# Patient Record
Sex: FEMALE | Race: AMERICAN INDIAN OR ALASKA NATIVE | Employment: OTHER | URBAN - METROPOLITAN AREA
[De-identification: names, ages, dates, MRNs, and addresses within clinical notes are randomized per-mention and may not be internally consistent; named-entity substitution may affect disease eponyms.]

---

## 2019-01-01 ENCOUNTER — APPOINTMENT (OUTPATIENT)
Dept: ULTRASOUND IMAGING | Facility: HOSPITAL | Age: 84
End: 2019-01-01
Attending: FAMILY MEDICINE
Payer: MEDICARE

## 2019-01-01 ENCOUNTER — HOSPITAL ENCOUNTER (EMERGENCY)
Facility: HOSPITAL | Age: 84
Discharge: HOME | End: 2019-01-01
Attending: EMERGENCY MEDICINE
Payer: MEDICARE

## 2019-01-01 ENCOUNTER — LAB ENCOUNTER (OUTPATIENT)
Dept: LAB | Facility: HOSPITAL | Age: 84
End: 2019-01-01
Attending: FAMILY MEDICINE
Payer: MEDICARE

## 2019-01-01 ENCOUNTER — HOSPITAL ENCOUNTER (OUTPATIENT)
Dept: CV DIAGNOSTICS | Facility: HOSPITAL | Age: 84
Discharge: HOME | End: 2019-01-01
Attending: FAMILY MEDICINE
Payer: MEDICARE

## 2019-01-01 ENCOUNTER — APPOINTMENT (OUTPATIENT)
Dept: CT IMAGING | Facility: HOSPITAL | Age: 84
End: 2019-01-01
Attending: EMERGENCY MEDICINE
Payer: MEDICARE

## 2019-01-01 VITALS
WEIGHT: 150 LBS | OXYGEN SATURATION: 100 % | RESPIRATION RATE: 18 BRPM | SYSTOLIC BLOOD PRESSURE: 146 MMHG | HEIGHT: 60 IN | TEMPERATURE: 98 F | HEART RATE: 67 BPM | DIASTOLIC BLOOD PRESSURE: 80 MMHG | BODY MASS INDEX: 29.45 KG/M2

## 2019-01-01 DIAGNOSIS — I10 HYPERTENSION, ESSENTIAL: ICD-10-CM

## 2019-01-01 DIAGNOSIS — I44.0 AV BLOC FIRST DEGREE: ICD-10-CM

## 2019-01-01 DIAGNOSIS — R53.83 FATIGUE: ICD-10-CM

## 2019-01-01 DIAGNOSIS — E87.1 HYPOSMOLALITY SYNDROME: ICD-10-CM

## 2019-01-01 DIAGNOSIS — R94.31 ABNORMAL EKG: ICD-10-CM

## 2019-01-01 DIAGNOSIS — M79.89 SWELLING OF LIMB: ICD-10-CM

## 2019-01-01 DIAGNOSIS — R51.9 FACIAL PAIN: ICD-10-CM

## 2019-01-01 DIAGNOSIS — R51.9 HEAD PAIN: ICD-10-CM

## 2019-01-01 DIAGNOSIS — R42 VERTIGO: Primary | ICD-10-CM

## 2019-01-01 DIAGNOSIS — R42 DIZZINESS: ICD-10-CM

## 2019-01-01 DIAGNOSIS — R42 DIZZINESS AND GIDDINESS: Primary | ICD-10-CM

## 2019-01-01 DIAGNOSIS — I10 ESSENTIAL HYPERTENSION, MALIGNANT: ICD-10-CM

## 2019-01-01 DIAGNOSIS — R51.9 ACUTE NONINTRACTABLE HEADACHE, UNSPECIFIED HEADACHE TYPE: ICD-10-CM

## 2019-01-01 DIAGNOSIS — D64.9 ANEMIA, UNSPECIFIED: ICD-10-CM

## 2019-01-01 LAB
ALBUMIN SERPL-MCNC: 3.3 G/DL (ref 3.4–5)
ALBUMIN SERPL-MCNC: 3.4 G/DL (ref 3.4–5)
ALBUMIN/GLOB SERPL: 0.8 {RATIO} (ref 1–2)
ALBUMIN/GLOB SERPL: 1 {RATIO} (ref 1–2)
ALP LIVER SERPL-CCNC: 109 U/L (ref 55–142)
ALP LIVER SERPL-CCNC: 113 U/L (ref 55–142)
ALT SERPL-CCNC: 14 U/L (ref 13–56)
ALT SERPL-CCNC: 15 U/L (ref 13–56)
ANION GAP SERPL CALC-SCNC: 5 MMOL/L (ref 0–18)
ANION GAP SERPL CALC-SCNC: 7 MMOL/L (ref 0–18)
AST SERPL-CCNC: 17 U/L (ref 15–37)
AST SERPL-CCNC: 36 U/L (ref 15–37)
ATRIAL RATE: 72 BPM
BASOPHILS # BLD AUTO: 0.03 X10(3) UL (ref 0–0.2)
BASOPHILS # BLD AUTO: 0.04 X10(3) UL (ref 0–0.2)
BASOPHILS NFR BLD AUTO: 0.6 %
BASOPHILS NFR BLD AUTO: 0.9 %
BILIRUB SERPL-MCNC: 0.4 MG/DL (ref 0.1–2)
BILIRUB SERPL-MCNC: 0.5 MG/DL (ref 0.1–2)
BILIRUB UR QL STRIP.AUTO: NEGATIVE
BUN BLD-MCNC: 10 MG/DL (ref 7–18)
BUN BLD-MCNC: 14 MG/DL (ref 7–18)
BUN/CREAT SERPL: 15.2 (ref 10–20)
BUN/CREAT SERPL: 17.9 (ref 10–20)
CALCIUM BLD-MCNC: 8.8 MG/DL (ref 8.5–10.1)
CALCIUM BLD-MCNC: 9 MG/DL (ref 8.5–10.1)
CHLORIDE SERPL-SCNC: 101 MMOL/L (ref 98–112)
CHLORIDE SERPL-SCNC: 98 MMOL/L (ref 98–112)
CLARITY UR REFRACT.AUTO: CLEAR
CO2 SERPL-SCNC: 23 MMOL/L (ref 21–32)
CO2 SERPL-SCNC: 25 MMOL/L (ref 21–32)
CREAT BLD-MCNC: 0.66 MG/DL (ref 0.55–1.02)
CREAT BLD-MCNC: 0.78 MG/DL (ref 0.55–1.02)
DEPRECATED HBV CORE AB SER IA-ACNC: 49.9 NG/ML (ref 18–340)
DEPRECATED RDW RBC AUTO: 33.2 FL (ref 35.1–46.3)
DEPRECATED RDW RBC AUTO: 34.2 FL (ref 35.1–46.3)
EOSINOPHIL # BLD AUTO: 0.24 X10(3) UL (ref 0–0.7)
EOSINOPHIL # BLD AUTO: 0.29 X10(3) UL (ref 0–0.7)
EOSINOPHIL NFR BLD AUTO: 4.9 %
EOSINOPHIL NFR BLD AUTO: 6.3 %
ERYTHROCYTE [DISTWIDTH] IN BLOOD BY AUTOMATED COUNT: 15.2 % (ref 11–15)
ERYTHROCYTE [DISTWIDTH] IN BLOOD BY AUTOMATED COUNT: 15.4 % (ref 11–15)
GLOBULIN PLAS-MCNC: 3.4 G/DL (ref 2.8–4.4)
GLOBULIN PLAS-MCNC: 4 G/DL (ref 2.8–4.4)
GLUCOSE BLD-MCNC: 105 MG/DL (ref 70–99)
GLUCOSE BLD-MCNC: 78 MG/DL (ref 70–99)
GLUCOSE UR STRIP.AUTO-MCNC: NEGATIVE MG/DL
HCT VFR BLD AUTO: 31.5 % (ref 35–48)
HCT VFR BLD AUTO: 34 % (ref 35–48)
HGB BLD-MCNC: 10.5 G/DL (ref 12–16)
HGB BLD-MCNC: 9.9 G/DL (ref 12–16)
IMM GRANULOCYTES # BLD AUTO: 0.01 X10(3) UL (ref 0–1)
IMM GRANULOCYTES # BLD AUTO: 0.03 X10(3) UL (ref 0–1)
IMM GRANULOCYTES NFR BLD: 0.2 %
IMM GRANULOCYTES NFR BLD: 0.6 %
IRON SERPL-MCNC: 96 UG/DL (ref 50–170)
KETONES UR STRIP.AUTO-MCNC: NEGATIVE MG/DL
LEUKOCYTE ESTERASE UR QL STRIP.AUTO: NEGATIVE
LYMPHOCYTES # BLD AUTO: 1.17 X10(3) UL (ref 1–4)
LYMPHOCYTES # BLD AUTO: 1.42 X10(3) UL (ref 1–4)
LYMPHOCYTES NFR BLD AUTO: 24 %
LYMPHOCYTES NFR BLD AUTO: 30.7 %
M PROTEIN MFR SERPL ELPH: 6.8 G/DL (ref 6.4–8.2)
M PROTEIN MFR SERPL ELPH: 7.3 G/DL (ref 6.4–8.2)
MCH RBC QN AUTO: 20 PG (ref 26–34)
MCH RBC QN AUTO: 20 PG (ref 26–34)
MCHC RBC AUTO-ENTMCNC: 30.9 G/DL (ref 31–37)
MCHC RBC AUTO-ENTMCNC: 31.4 G/DL (ref 31–37)
MCV RBC AUTO: 63.6 FL (ref 80–100)
MCV RBC AUTO: 64.8 FL (ref 80–100)
MONOCYTES # BLD AUTO: 0.45 X10(3) UL (ref 0.1–1)
MONOCYTES # BLD AUTO: 0.58 X10(3) UL (ref 0.1–1)
MONOCYTES NFR BLD AUTO: 12.5 %
MONOCYTES NFR BLD AUTO: 9.2 %
NEUTROPHILS # BLD AUTO: 2.29 X10 (3) UL (ref 1.5–7.7)
NEUTROPHILS # BLD AUTO: 2.29 X10(3) UL (ref 1.5–7.7)
NEUTROPHILS # BLD AUTO: 2.95 X10 (3) UL (ref 1.5–7.7)
NEUTROPHILS # BLD AUTO: 2.95 X10(3) UL (ref 1.5–7.7)
NEUTROPHILS NFR BLD AUTO: 49.4 %
NEUTROPHILS NFR BLD AUTO: 60.7 %
NITRITE UR QL STRIP.AUTO: NEGATIVE
OSMOLALITY SERPL CALC.SUM OF ELEC: 264 MOSM/KG (ref 275–295)
OSMOLALITY SERPL CALC.SUM OF ELEC: 273 MOSM/KG (ref 275–295)
P AXIS: 16 DEGREES
P-R INTERVAL: 182 MS
PH UR STRIP.AUTO: 8 [PH] (ref 4.5–8)
PLATELET # BLD AUTO: 330 10(3)UL (ref 150–450)
PLATELET # BLD AUTO: 341 10(3)UL (ref 150–450)
POTASSIUM SERPL-SCNC: 4.6 MMOL/L (ref 3.5–5.1)
POTASSIUM SERPL-SCNC: 5.3 MMOL/L (ref 3.5–5.1)
PROT UR STRIP.AUTO-MCNC: NEGATIVE MG/DL
Q-T INTERVAL: 376 MS
QRS DURATION: 86 MS
QTC CALCULATION (BEZET): 411 MS
R AXIS: -55 DEGREES
RBC # BLD AUTO: 4.95 X10(6)UL (ref 3.8–5.3)
RBC # BLD AUTO: 5.25 X10(6)UL (ref 3.8–5.3)
SODIUM SERPL-SCNC: 128 MMOL/L (ref 136–145)
SODIUM SERPL-SCNC: 131 MMOL/L (ref 136–145)
SP GR UR STRIP.AUTO: <1.005 (ref 1–1.03)
T AXIS: 31 DEGREES
TSI SER-ACNC: 3.74 MIU/ML (ref 0.36–3.74)
UROBILINOGEN UR STRIP.AUTO-MCNC: <2 MG/DL
VENTRICULAR RATE: 72 BPM
WBC # BLD AUTO: 4.6 X10(3) UL (ref 4–11)
WBC # BLD AUTO: 4.9 X10(3) UL (ref 4–11)

## 2019-01-01 PROCEDURE — 70450 CT HEAD/BRAIN W/O DYE: CPT | Performed by: EMERGENCY MEDICINE

## 2019-01-01 PROCEDURE — 84443 ASSAY THYROID STIM HORMONE: CPT

## 2019-01-01 PROCEDURE — 93306 TTE W/DOPPLER COMPLETE: CPT | Performed by: FAMILY MEDICINE

## 2019-01-01 PROCEDURE — 82728 ASSAY OF FERRITIN: CPT

## 2019-01-01 PROCEDURE — 85025 COMPLETE CBC W/AUTO DIFF WBC: CPT | Performed by: EMERGENCY MEDICINE

## 2019-01-01 PROCEDURE — 99285 EMERGENCY DEPT VISIT HI MDM: CPT

## 2019-01-01 PROCEDURE — 36415 COLL VENOUS BLD VENIPUNCTURE: CPT

## 2019-01-01 PROCEDURE — 93005 ELECTROCARDIOGRAM TRACING: CPT

## 2019-01-01 PROCEDURE — 80053 COMPREHEN METABOLIC PANEL: CPT

## 2019-01-01 PROCEDURE — 99284 EMERGENCY DEPT VISIT MOD MDM: CPT

## 2019-01-01 PROCEDURE — 81001 URINALYSIS AUTO W/SCOPE: CPT | Performed by: EMERGENCY MEDICINE

## 2019-01-01 PROCEDURE — 93010 ELECTROCARDIOGRAM REPORT: CPT

## 2019-01-01 PROCEDURE — 83540 ASSAY OF IRON: CPT

## 2019-01-01 PROCEDURE — 85025 COMPLETE CBC W/AUTO DIFF WBC: CPT

## 2019-01-01 PROCEDURE — 80053 COMPREHEN METABOLIC PANEL: CPT | Performed by: EMERGENCY MEDICINE

## 2019-01-01 RX ORDER — MECLIZINE HYDROCHLORIDE 25 MG/1
25 TABLET ORAL ONCE
Status: COMPLETED | OUTPATIENT
Start: 2019-01-01 | End: 2019-01-01

## 2019-01-01 RX ORDER — MELATONIN
325
Status: ON HOLD | COMMUNITY
End: 2020-01-01

## 2019-01-01 RX ORDER — MECLIZINE HYDROCHLORIDE 25 MG/1
25 TABLET ORAL 3 TIMES DAILY PRN
Qty: 20 TABLET | Refills: 0 | Status: SHIPPED | OUTPATIENT
Start: 2019-01-01 | End: 2020-01-01

## 2019-02-03 ENCOUNTER — APPOINTMENT (OUTPATIENT)
Dept: ULTRASOUND IMAGING | Age: 84
End: 2019-02-03
Attending: EMERGENCY MEDICINE
Payer: MEDICARE

## 2019-02-03 ENCOUNTER — APPOINTMENT (OUTPATIENT)
Dept: GENERAL RADIOLOGY | Age: 84
End: 2019-02-03
Attending: EMERGENCY MEDICINE
Payer: MEDICARE

## 2019-02-03 ENCOUNTER — HOSPITAL ENCOUNTER (OUTPATIENT)
Age: 84
Discharge: HOME | End: 2019-02-03
Attending: EMERGENCY MEDICINE
Payer: MEDICARE

## 2019-02-03 VITALS
OXYGEN SATURATION: 100 % | HEART RATE: 76 BPM | HEIGHT: 62 IN | BODY MASS INDEX: 29.44 KG/M2 | RESPIRATION RATE: 18 BRPM | WEIGHT: 160 LBS | TEMPERATURE: 98 F | SYSTOLIC BLOOD PRESSURE: 146 MMHG | DIASTOLIC BLOOD PRESSURE: 50 MMHG

## 2019-02-03 DIAGNOSIS — M54.31 SCIATICA OF RIGHT SIDE: Primary | ICD-10-CM

## 2019-02-03 PROCEDURE — 72110 X-RAY EXAM L-2 SPINE 4/>VWS: CPT | Performed by: EMERGENCY MEDICINE

## 2019-02-03 PROCEDURE — 99203 OFFICE O/P NEW LOW 30 MIN: CPT

## 2019-02-03 PROCEDURE — 96372 THER/PROPH/DIAG INJ SC/IM: CPT

## 2019-02-03 PROCEDURE — 93971 EXTREMITY STUDY: CPT | Performed by: EMERGENCY MEDICINE

## 2019-02-03 PROCEDURE — 99204 OFFICE O/P NEW MOD 45 MIN: CPT

## 2019-02-03 RX ORDER — POLYETHYLENE GLYCOL 3350 17 G/17G
17 POWDER, FOR SOLUTION ORAL DAILY PRN
Qty: 12 EACH | Refills: 0 | Status: SHIPPED | OUTPATIENT
Start: 2019-02-03 | End: 2019-03-05

## 2019-02-03 RX ORDER — TRAMADOL HYDROCHLORIDE 50 MG/1
50 TABLET ORAL EVERY 4 HOURS PRN
Qty: 10 TABLET | Refills: 0 | Status: SHIPPED | OUTPATIENT
Start: 2019-02-03 | End: 2019-02-10

## 2019-02-03 RX ORDER — KETOROLAC TROMETHAMINE 30 MG/ML
30 INJECTION, SOLUTION INTRAMUSCULAR; INTRAVENOUS ONCE
Status: COMPLETED | OUTPATIENT
Start: 2019-02-03 | End: 2019-02-03

## 2019-02-03 RX ORDER — NAPROXEN 500 MG/1
500 TABLET ORAL 2 TIMES DAILY WITH MEALS
Status: ON HOLD | COMMUNITY
End: 2019-02-14

## 2019-02-03 RX ORDER — ACETAMINOPHEN 500 MG
500 TABLET ORAL EVERY 6 HOURS PRN
COMMUNITY

## 2019-02-03 RX ORDER — PREDNISONE 20 MG/1
20 TABLET ORAL DAILY
Status: ON HOLD | COMMUNITY
End: 2019-02-14

## 2019-02-03 NOTE — ED INITIAL ASSESSMENT (HPI)
Pt with R lower back pain since Wed, pain moved to R buttocks and R leg since Friday - per family member R lower leg is swollen    Denies injury, fever, bruising, chest pain, lenin; no recent travel    Denies DVT hx

## 2019-02-06 ENCOUNTER — APPOINTMENT (OUTPATIENT)
Dept: MRI IMAGING | Facility: HOSPITAL | Age: 84
End: 2019-02-06
Attending: EMERGENCY MEDICINE
Payer: MEDICARE

## 2019-02-06 ENCOUNTER — APPOINTMENT (OUTPATIENT)
Dept: CT IMAGING | Facility: HOSPITAL | Age: 84
End: 2019-02-06
Attending: EMERGENCY MEDICINE
Payer: MEDICARE

## 2019-02-06 ENCOUNTER — HOSPITAL ENCOUNTER (EMERGENCY)
Facility: HOSPITAL | Age: 84
Discharge: HOME | End: 2019-02-06
Attending: EMERGENCY MEDICINE
Payer: MEDICARE

## 2019-02-06 VITALS
WEIGHT: 160.06 LBS | OXYGEN SATURATION: 98 % | HEIGHT: 60 IN | TEMPERATURE: 99 F | HEART RATE: 61 BPM | RESPIRATION RATE: 19 BRPM | DIASTOLIC BLOOD PRESSURE: 73 MMHG | SYSTOLIC BLOOD PRESSURE: 164 MMHG | BODY MASS INDEX: 31.42 KG/M2

## 2019-02-06 DIAGNOSIS — S32.10XA CLOSED FRACTURE OF SACRUM, UNSPECIFIED PORTION OF SACRUM, INITIAL ENCOUNTER (HCC): ICD-10-CM

## 2019-02-06 DIAGNOSIS — M54.50 LOW BACK PAIN AT MULTIPLE SITES: Primary | ICD-10-CM

## 2019-02-06 LAB
ALBUMIN SERPL-MCNC: 3.2 G/DL (ref 3.1–4.5)
ALBUMIN/GLOB SERPL: 0.9 {RATIO} (ref 1–2)
ALP LIVER SERPL-CCNC: 113 U/L (ref 55–142)
ALT SERPL-CCNC: 15 U/L (ref 14–54)
ANION GAP SERPL CALC-SCNC: 8 MMOL/L (ref 0–18)
APTT PPP: 31.7 SECONDS (ref 26.1–34.6)
AST SERPL-CCNC: 19 U/L (ref 15–41)
BASOPHILS # BLD AUTO: 0.03 X10(3) UL (ref 0–0.2)
BASOPHILS NFR BLD AUTO: 0.4 %
BILIRUB SERPL-MCNC: 0.5 MG/DL (ref 0.1–2)
BILIRUB UR QL STRIP.AUTO: NEGATIVE
BUN BLD-MCNC: 15 MG/DL (ref 8–20)
BUN/CREAT SERPL: 17.9 (ref 10–20)
CALCIUM BLD-MCNC: 8.6 MG/DL (ref 8.3–10.3)
CHLORIDE SERPL-SCNC: 100 MMOL/L (ref 101–111)
CLARITY UR REFRACT.AUTO: CLEAR
CO2 SERPL-SCNC: 27 MMOL/L (ref 22–32)
CREAT BLD-MCNC: 0.84 MG/DL (ref 0.55–1.02)
DEPRECATED RDW RBC AUTO: 35.1 FL (ref 35.1–46.3)
EOSINOPHIL # BLD AUTO: 0.54 X10(3) UL (ref 0–0.7)
EOSINOPHIL NFR BLD AUTO: 6.7 %
ERYTHROCYTE [DISTWIDTH] IN BLOOD BY AUTOMATED COUNT: 16.2 % (ref 11–15)
GLOBULIN PLAS-MCNC: 3.7 G/DL (ref 2.8–4.4)
GLUCOSE BLD-MCNC: 91 MG/DL (ref 70–99)
GLUCOSE UR STRIP.AUTO-MCNC: NEGATIVE MG/DL
HCT VFR BLD AUTO: 34.1 % (ref 35–48)
HGB BLD-MCNC: 10.9 G/DL (ref 12–16)
IMM GRANULOCYTES # BLD AUTO: 0.07 X10(3) UL (ref 0–1)
IMM GRANULOCYTES NFR BLD: 0.9 %
INR BLD: 0.99 (ref 0.9–1.1)
KETONES UR STRIP.AUTO-MCNC: NEGATIVE MG/DL
LEUKOCYTE ESTERASE UR QL STRIP.AUTO: NEGATIVE
LIPASE: 174 U/L (ref 73–393)
LYMPHOCYTES # BLD AUTO: 1.14 X10(3) UL (ref 1–4)
LYMPHOCYTES NFR BLD AUTO: 14.1 %
M PROTEIN MFR SERPL ELPH: 6.9 G/DL (ref 6.4–8.2)
MCH RBC QN AUTO: 20.3 PG (ref 26–34)
MCHC RBC AUTO-ENTMCNC: 32 G/DL (ref 31–37)
MCV RBC AUTO: 63.6 FL (ref 80–100)
MONOCYTES # BLD AUTO: 0.75 X10(3) UL (ref 0.1–1)
MONOCYTES NFR BLD AUTO: 9.3 %
NEUTROPHILS # BLD AUTO: 5.54 X10 (3) UL (ref 1.5–7.7)
NEUTROPHILS # BLD AUTO: 5.54 X10(3) UL (ref 1.5–7.7)
NEUTROPHILS NFR BLD AUTO: 68.6 %
NITRITE UR QL STRIP.AUTO: NEGATIVE
OSMOLALITY SERPL CALC.SUM OF ELEC: 280 MOSM/KG (ref 275–295)
PH UR STRIP.AUTO: 7 [PH] (ref 4.5–8)
PLATELET # BLD AUTO: 422 10(3)UL (ref 150–450)
POTASSIUM SERPL-SCNC: 3.9 MMOL/L (ref 3.6–5.1)
PROT UR STRIP.AUTO-MCNC: NEGATIVE MG/DL
PSA SERPL DL<=0.01 NG/ML-MCNC: 13.5 SECONDS (ref 12.4–14.7)
RBC # BLD AUTO: 5.36 X10(6)UL (ref 3.8–5.3)
RBC #/AREA URNS AUTO: >10 /HPF
SODIUM SERPL-SCNC: 135 MMOL/L (ref 136–144)
SP GR UR STRIP.AUTO: 1.01 (ref 1–1.03)
UROBILINOGEN UR STRIP.AUTO-MCNC: <2 MG/DL
WBC # BLD AUTO: 8.1 X10(3) UL (ref 4–11)

## 2019-02-06 PROCEDURE — 96374 THER/PROPH/DIAG INJ IV PUSH: CPT

## 2019-02-06 PROCEDURE — 85610 PROTHROMBIN TIME: CPT | Performed by: EMERGENCY MEDICINE

## 2019-02-06 PROCEDURE — 99285 EMERGENCY DEPT VISIT HI MDM: CPT

## 2019-02-06 PROCEDURE — 72195 MRI PELVIS W/O DYE: CPT | Performed by: EMERGENCY MEDICINE

## 2019-02-06 PROCEDURE — 72148 MRI LUMBAR SPINE W/O DYE: CPT | Performed by: EMERGENCY MEDICINE

## 2019-02-06 PROCEDURE — 96376 TX/PRO/DX INJ SAME DRUG ADON: CPT

## 2019-02-06 PROCEDURE — 80053 COMPREHEN METABOLIC PANEL: CPT | Performed by: EMERGENCY MEDICINE

## 2019-02-06 PROCEDURE — 85730 THROMBOPLASTIN TIME PARTIAL: CPT | Performed by: EMERGENCY MEDICINE

## 2019-02-06 PROCEDURE — 83690 ASSAY OF LIPASE: CPT | Performed by: EMERGENCY MEDICINE

## 2019-02-06 PROCEDURE — 81001 URINALYSIS AUTO W/SCOPE: CPT | Performed by: EMERGENCY MEDICINE

## 2019-02-06 PROCEDURE — 74177 CT ABD & PELVIS W/CONTRAST: CPT | Performed by: EMERGENCY MEDICINE

## 2019-02-06 PROCEDURE — 85025 COMPLETE CBC W/AUTO DIFF WBC: CPT | Performed by: EMERGENCY MEDICINE

## 2019-02-06 RX ORDER — HYDROCODONE BITARTRATE AND ACETAMINOPHEN 5; 325 MG/1; MG/1
1 TABLET ORAL EVERY 8 HOURS PRN
Qty: 15 TABLET | Refills: 0 | Status: ON HOLD | OUTPATIENT
Start: 2019-02-06 | End: 2019-02-14

## 2019-02-06 RX ORDER — MORPHINE SULFATE 4 MG/ML
1 INJECTION, SOLUTION INTRAMUSCULAR; INTRAVENOUS ONCE
Status: COMPLETED | OUTPATIENT
Start: 2019-02-06 | End: 2019-02-06

## 2019-02-06 NOTE — ED PROVIDER NOTES
Patient Seen in: [facility] Emergency Department    History   Patient presents with:  Back Pain (musculoskeletal)    Stated Complaint: back pain    HPI    Patient is an 77-year-old female presents emergency room with a history of low back pain which Ht 152.4 cm (5')   Wt 72.6 kg   SpO2 96%   BMI 31.26 kg/m²         Physical Exam  GENERAL: Well-developed, well-nourished female sitting up breathing easily in no apparent distress. Patient is nontoxic in appearance.   HEENT: Head is normocephalic, atrauma (*)     Mucous Urine 1+ (*)     All other components within normal limits   CBC W/ DIFFERENTIAL - Abnormal; Notable for the following components:    RBC 5.36 (*)     HGB 10.9 (*)     HCT 34.1 (*)     MCV 63.6 (*)     MCH 20.3 (*)     RDW 16.2 (*)     All o were read back. Dictated by: Elsie Cabral MD on 2/06/2019 at 8:49     Approved by: Elsie Cabral MD                MDM   14:15  PT WITH NO OTHER COMPLAINTS AT THIS TIME. ALL DETAILS DISCUSSED WITH DR Marilee Hernandez WHO FEELS THAT THE PATIENT CAN GO HOME. pm    Follow-up:  Ness Cole MD  [address] [phone]    Call in 2 days  [address] [place] [address], [address] [place] [address], [address] [place] [address]  [phone]    Call in 2 days

## 2019-02-06 NOTE — ED INITIAL ASSESSMENT (HPI)
Pt arrives via EMS for back pain that started on Sunday and has not been getting better. Pt does not speak english. Family on the way in.

## 2019-02-12 ENCOUNTER — HOSPITAL ENCOUNTER (OUTPATIENT)
Facility: HOSPITAL | Age: 84
Setting detail: OBSERVATION
Discharge: NURSING, REHAB OR LONG-TERM CARE | End: 2019-02-14
Attending: EMERGENCY MEDICINE | Admitting: FAMILY MEDICINE
Payer: MEDICARE

## 2019-02-12 DIAGNOSIS — R26.2 IMPAIRED AMBULATION: ICD-10-CM

## 2019-02-12 DIAGNOSIS — E11.9 DIABETES (HCC): ICD-10-CM

## 2019-02-12 DIAGNOSIS — M84.48XS SACRAL INSUFFICIENCY FRACTURE, SEQUELA: Primary | ICD-10-CM

## 2019-02-12 PROBLEM — M84.48XA SACRAL INSUFFICIENCY FRACTURE: Status: ACTIVE | Noted: 2019-02-12

## 2019-02-12 PROCEDURE — 96372 THER/PROPH/DIAG INJ SC/IM: CPT

## 2019-02-12 PROCEDURE — 96374 THER/PROPH/DIAG INJ IV PUSH: CPT

## 2019-02-12 PROCEDURE — 99285 EMERGENCY DEPT VISIT HI MDM: CPT

## 2019-02-12 RX ORDER — POLYETHYLENE GLYCOL 3350 17 G/17G
17 POWDER, FOR SOLUTION ORAL DAILY PRN
Status: DISCONTINUED | OUTPATIENT
Start: 2019-02-12 | End: 2019-02-14

## 2019-02-12 RX ORDER — LEVOTHYROXINE SODIUM 0.07 MG/1
75 TABLET ORAL
COMMUNITY

## 2019-02-12 RX ORDER — LEVOTHYROXINE SODIUM 0.07 MG/1
75 TABLET ORAL
Status: DISCONTINUED | OUTPATIENT
Start: 2019-02-12 | End: 2019-02-14

## 2019-02-12 RX ORDER — HEPARIN SODIUM 5000 [USP'U]/ML
5000 INJECTION, SOLUTION INTRAVENOUS; SUBCUTANEOUS EVERY 12 HOURS SCHEDULED
Status: DISCONTINUED | OUTPATIENT
Start: 2019-02-12 | End: 2019-02-14

## 2019-02-12 RX ORDER — HYDROMORPHONE HYDROCHLORIDE 1 MG/ML
0.5 INJECTION, SOLUTION INTRAMUSCULAR; INTRAVENOUS; SUBCUTANEOUS EVERY 30 MIN PRN
Status: DISCONTINUED | OUTPATIENT
Start: 2019-02-12 | End: 2019-02-12

## 2019-02-12 RX ORDER — LISINOPRIL 10 MG/1
10 TABLET ORAL DAILY
Status: DISCONTINUED | OUTPATIENT
Start: 2019-02-12 | End: 2019-02-14

## 2019-02-12 RX ORDER — HYDROMORPHONE HYDROCHLORIDE 1 MG/ML
0.5 INJECTION, SOLUTION INTRAMUSCULAR; INTRAVENOUS; SUBCUTANEOUS EVERY 30 MIN PRN
Status: DISCONTINUED | OUTPATIENT
Start: 2019-02-12 | End: 2019-02-14

## 2019-02-12 RX ORDER — HYDROCODONE BITARTRATE AND ACETAMINOPHEN 5; 325 MG/1; MG/1
1 TABLET ORAL EVERY 6 HOURS PRN
Status: DISCONTINUED | OUTPATIENT
Start: 2019-02-12 | End: 2019-02-13

## 2019-02-12 RX ORDER — ACETAMINOPHEN 500 MG
500 TABLET ORAL EVERY 6 HOURS PRN
Status: DISCONTINUED | OUTPATIENT
Start: 2019-02-12 | End: 2019-02-14

## 2019-02-12 RX ORDER — ONDANSETRON 2 MG/ML
4 INJECTION INTRAMUSCULAR; INTRAVENOUS EVERY 4 HOURS PRN
Status: DISCONTINUED | OUTPATIENT
Start: 2019-02-12 | End: 2019-02-14

## 2019-02-12 RX ORDER — SODIUM CHLORIDE 9 MG/ML
INJECTION, SOLUTION INTRAVENOUS CONTINUOUS
Status: DISCONTINUED | OUTPATIENT
Start: 2019-02-12 | End: 2019-02-14

## 2019-02-12 RX ORDER — TEMAZEPAM 7.5 MG/1
7.5 CAPSULE ORAL NIGHTLY PRN
Status: DISCONTINUED | OUTPATIENT
Start: 2019-02-12 | End: 2019-02-13

## 2019-02-12 RX ORDER — LISINOPRIL 10 MG/1
10 TABLET ORAL DAILY
COMMUNITY

## 2019-02-13 ENCOUNTER — APPOINTMENT (OUTPATIENT)
Dept: ULTRASOUND IMAGING | Facility: HOSPITAL | Age: 84
End: 2019-02-13
Attending: FAMILY MEDICINE
Payer: MEDICARE

## 2019-02-13 LAB
ALBUMIN SERPL-MCNC: 2.6 G/DL (ref 3.4–5)
ALBUMIN/GLOB SERPL: 0.8 {RATIO} (ref 1–2)
ALP LIVER SERPL-CCNC: 138 U/L (ref 55–142)
ALT SERPL-CCNC: 14 U/L (ref 13–56)
ANION GAP SERPL CALC-SCNC: 6 MMOL/L (ref 0–18)
AST SERPL-CCNC: 20 U/L (ref 15–37)
BASOPHILS # BLD AUTO: 0.03 X10(3) UL (ref 0–0.2)
BASOPHILS NFR BLD AUTO: 0.5 %
BILIRUB SERPL-MCNC: 0.4 MG/DL (ref 0.1–2)
BILIRUB UR QL STRIP.AUTO: NEGATIVE
BUN BLD-MCNC: 20 MG/DL (ref 7–18)
BUN/CREAT SERPL: 28.2 (ref 10–20)
CALCIUM BLD-MCNC: 8.4 MG/DL (ref 8.5–10.1)
CHLORIDE SERPL-SCNC: 105 MMOL/L (ref 98–107)
CO2 SERPL-SCNC: 26 MMOL/L (ref 21–32)
COLOR UR AUTO: YELLOW
CREAT BLD-MCNC: 0.71 MG/DL (ref 0.55–1.02)
DEPRECATED RDW RBC AUTO: 34.9 FL (ref 35.1–46.3)
EOSINOPHIL # BLD AUTO: 0.39 X10(3) UL (ref 0–0.7)
EOSINOPHIL NFR BLD AUTO: 7 %
ERYTHROCYTE [DISTWIDTH] IN BLOOD BY AUTOMATED COUNT: 15.8 % (ref 11–15)
GLOBULIN PLAS-MCNC: 3.4 G/DL (ref 2.8–4.4)
GLUCOSE BLD-MCNC: 85 MG/DL (ref 70–99)
GLUCOSE UR STRIP.AUTO-MCNC: NEGATIVE MG/DL
HCT VFR BLD AUTO: 28.3 % (ref 35–48)
HGB BLD-MCNC: 9.3 G/DL (ref 12–16)
IMM GRANULOCYTES # BLD AUTO: 0.03 X10(3) UL (ref 0–1)
IMM GRANULOCYTES NFR BLD: 0.5 %
KETONES UR STRIP.AUTO-MCNC: NEGATIVE MG/DL
LYMPHOCYTES # BLD AUTO: 1.2 X10(3) UL (ref 1–4)
LYMPHOCYTES NFR BLD AUTO: 21.6 %
M PROTEIN MFR SERPL ELPH: 6 G/DL (ref 6.4–8.2)
MCH RBC QN AUTO: 20.9 PG (ref 26–34)
MCHC RBC AUTO-ENTMCNC: 32.9 G/DL (ref 31–37)
MCV RBC AUTO: 63.6 FL (ref 80–100)
MONOCYTES # BLD AUTO: 0.5 X10(3) UL (ref 0.1–1)
MONOCYTES NFR BLD AUTO: 9 %
NEUTROPHILS # BLD AUTO: 3.41 X10 (3) UL (ref 1.5–7.7)
NEUTROPHILS # BLD AUTO: 3.41 X10(3) UL (ref 1.5–7.7)
NEUTROPHILS NFR BLD AUTO: 61.4 %
NITRITE UR QL STRIP.AUTO: NEGATIVE
OSMOLALITY SERPL CALC.SUM OF ELEC: 286 MOSM/KG (ref 275–295)
PH UR STRIP.AUTO: 5 [PH] (ref 4.5–8)
PLATELET # BLD AUTO: 360 10(3)UL (ref 150–450)
POTASSIUM SERPL-SCNC: 4.9 MMOL/L (ref 3.5–5.1)
PROT UR STRIP.AUTO-MCNC: NEGATIVE MG/DL
RBC # BLD AUTO: 4.45 X10(6)UL (ref 3.8–5.3)
RBC #/AREA URNS AUTO: >10 /HPF
SODIUM SERPL-SCNC: 137 MMOL/L (ref 136–145)
SP GR UR STRIP.AUTO: 1.02 (ref 1–1.03)
UROBILINOGEN UR STRIP.AUTO-MCNC: 2 MG/DL
WBC # BLD AUTO: 5.6 X10(3) UL (ref 4–11)

## 2019-02-13 PROCEDURE — 97535 SELF CARE MNGMENT TRAINING: CPT

## 2019-02-13 PROCEDURE — 87086 URINE CULTURE/COLONY COUNT: CPT | Performed by: FAMILY MEDICINE

## 2019-02-13 PROCEDURE — 87077 CULTURE AEROBIC IDENTIFY: CPT | Performed by: FAMILY MEDICINE

## 2019-02-13 PROCEDURE — 97530 THERAPEUTIC ACTIVITIES: CPT

## 2019-02-13 PROCEDURE — 97165 OT EVAL LOW COMPLEX 30 MIN: CPT

## 2019-02-13 PROCEDURE — 87186 SC STD MICRODIL/AGAR DIL: CPT | Performed by: FAMILY MEDICINE

## 2019-02-13 PROCEDURE — 85025 COMPLETE CBC W/AUTO DIFF WBC: CPT | Performed by: FAMILY MEDICINE

## 2019-02-13 PROCEDURE — 93970 EXTREMITY STUDY: CPT | Performed by: FAMILY MEDICINE

## 2019-02-13 PROCEDURE — 97161 PT EVAL LOW COMPLEX 20 MIN: CPT

## 2019-02-13 PROCEDURE — 81001 URINALYSIS AUTO W/SCOPE: CPT | Performed by: FAMILY MEDICINE

## 2019-02-13 PROCEDURE — 80053 COMPREHEN METABOLIC PANEL: CPT | Performed by: FAMILY MEDICINE

## 2019-02-13 RX ORDER — HYDROMORPHONE HYDROCHLORIDE 1 MG/ML
0.2 INJECTION, SOLUTION INTRAMUSCULAR; INTRAVENOUS; SUBCUTANEOUS EVERY 2 HOUR PRN
Status: DISCONTINUED | OUTPATIENT
Start: 2019-02-13 | End: 2019-02-14

## 2019-02-13 RX ORDER — ONDANSETRON 2 MG/ML
4 INJECTION INTRAMUSCULAR; INTRAVENOUS EVERY 6 HOURS PRN
Status: DISCONTINUED | OUTPATIENT
Start: 2019-02-13 | End: 2019-02-14

## 2019-02-13 RX ORDER — HYDROCODONE BITARTRATE AND ACETAMINOPHEN 5; 325 MG/1; MG/1
1 TABLET ORAL EVERY 4 HOURS PRN
Status: DISCONTINUED | OUTPATIENT
Start: 2019-02-13 | End: 2019-02-14

## 2019-02-13 RX ORDER — NALOXONE HYDROCHLORIDE 0.4 MG/ML
0.08 INJECTION, SOLUTION INTRAMUSCULAR; INTRAVENOUS; SUBCUTANEOUS
Status: DISCONTINUED | OUTPATIENT
Start: 2019-02-13 | End: 2019-02-14

## 2019-02-13 RX ORDER — NALBUPHINE HCL 10 MG/ML
2.5 AMPUL (ML) INJECTION EVERY 4 HOURS PRN
Status: DISCONTINUED | OUTPATIENT
Start: 2019-02-13 | End: 2019-02-14

## 2019-02-13 RX ORDER — DIPHENHYDRAMINE HYDROCHLORIDE 50 MG/ML
12.5 INJECTION INTRAMUSCULAR; INTRAVENOUS EVERY 4 HOURS PRN
Status: DISCONTINUED | OUTPATIENT
Start: 2019-02-13 | End: 2019-02-14

## 2019-02-13 NOTE — CM/SW NOTE
Patient failed outpatient care after ER/IC visits 2/4 and 2/6/19. Patient has [employer] and potentially has coverage for SNF without 3 midnight stay. Unfortunately this insurance will not give approval after hours.  I am also unable to obtain necessary

## 2019-02-13 NOTE — PHYSICAL THERAPY NOTE
PHYSICAL THERAPY EVALUATION - INPATIENT     Room Number: 374/374-A  Evaluation Date: 2/13/2019  Type of Evaluation: Initial  Physician Order: PT Eval and Treat    Presenting Problem: Sacral Insufficiency Fx  Reason for Therapy: Mobility Dysfunction a hours a day  service. SUBJECTIVE  \" My  may not be able to assist & my children work. Can I have increased hours for  service? \" Patient was participatory. Grand son - Elsa Fontanez was present.     Patient self-stated goal is to be a the bed?: A Little   How much help from another person does the patient currently need. ..   -   Moving to and from a bed to a chair (including a wheelchair)?: A Little   -   Need to walk in hospital room?: A Little   -   Climbing 3-5 steps with a railing?: therapy include Thyroid disease,HTN, Back problems, TKA bilateral.  In this PT evaluation, the patient presents with the following impairments pain in bilateral gluteal area, low back, decreased overall strength,decreased balance & endurance.   Functional o is able to ambulate 150 feet with assist device: walker - rolling at assistance level: supervision     Goal #4 Assess ability to manage 2 stairs    Goal #5    Goal #6    Goal Comments: Goals established on 2/13/2019

## 2019-02-13 NOTE — PROGRESS NOTES
NURSING ADMISSION NOTE      Patient admitted via Cart  Oriented to room. Safety precautions initiated. Bed in low position. Call light in reach. Admitted patient. A&Ox4. Son was at bedside for translation. Refused .  Bilateral lower extrem

## 2019-02-13 NOTE — CM/SW NOTE
SW informed by PT that HHPT with interm supervision/care. She notes that family does feel that pt needs [facility] stay. KELLI informed by TH, MBM-Mary, Cesar Esqueda that pt is clinically accepted pending Riya Iskevin.     KELLI spoke with pt's son, Rommel Porter

## 2019-02-13 NOTE — PLAN OF CARE
Patient/Family Goals    • Patient/Family Long Term Goal Progressing    • Patient/Family Short Term Goal Progressing        A&Ox4. VSS. Voids in commode. PT/OT in the morning. Social work for placement.  Pain well managed with pain Norco. Patient and son upd

## 2019-02-13 NOTE — ED INITIAL ASSESSMENT (HPI)
Per EMS and some from patient, seen here on 2/6 and diagnosed with non-traumatic fracture of sacrum. Pain still not controlled with norco medication. Pain is only present when moving or sitting upright.  Patient speaks limited [place]; she is requesting her

## 2019-02-13 NOTE — H&P
[facility]  History & Physical    Colette Marrero Patient Status:  Observation    1933 MRN [id number]   [facility] 3SW-A Attending Josiah Díaz MD   [facility] Day # 0 PCP Leonila Nj MD     2019  12:32 PM    History headache or dizziness  No nausea, vomiting or abdominal pain  Denies any problem with urination or bowel movement, but not able to reach in time, so has to wear depends and has occasional incontinence episodes  Denies any other physical complaints or clover in moderate to severe distress  HEENT: PERRLA. Extraocular muscles are intact. Throat, mucous membranes moist.  NECK: Supple. Midline trachea. No cervical lymphadenopathy  RESPIRATORY: Clear to auscultation bilaterally. No rales, rhonchi, or wheezing.    CA lower lumbar spine. CONCLUSION:  Diffuse osteopenia. No radiographic evidence of acute fracture or subluxation in the lumbar spine. Degenerative changes as above.     Dictated by: Nydia Leigh MD on 2/03/2019 at 15:47     Approved by: Laurie Aaron extends into the body of the sacrum. CONCLUSION:   1. Right sacral ala are fracture is again noted. There is suggestion of a subtle left sacral ala fracture as well. 2.  Moderate spinal canal stenosis at L4-L5 is noted.     Dictated by: Collette Dunks utilized for visualization of suspected pathology. Images were performed without contrast.  There is image degradation due to patient motion.   PATIENT STATED HISTORY: (As transcribed by Technologist)  Patient complains of back pain, further evaluation of AORTA/VASCULAR:  Moderate mixed plaque in the aorta and iliac arteries RETROPERITONEUM:  Unremarkable. BOWEL/MESENTERY:  Small hiatal hernia. No large or small bowel dilatation. Unremarkable appendix. Moderate feces in the colon.   Uncomplicated colonic Tamera Lechuga M.D.  [facility]  Phone [phone]

## 2019-02-13 NOTE — CM/SW NOTE
Spoke with son. He states as long as Dr. Cecilio Hillman recommends SNF and [employer] covers SNF, he will be in agreement with plan. Son would like referrals to:  [facility].

## 2019-02-13 NOTE — OCCUPATIONAL THERAPY NOTE
OCCUPATIONAL THERAPY EVALUATION - INPATIENT     Room Number: 374/374-A  Evaluation Date: 2/13/2019  Type of Evaluation: Initial  Presenting Problem: (sacral insufficiency fractures, back pain)    Physician Order: IP Consult to Occupational Therapy  Reason her head. SUBJECTIVE     Patient is pleasant and participates well.      OBJECTIVE  Precautions:  needed(Speaks [place])  Fall Risk: High fall risk    WEIGHT BEARING RESTRICTION  Weight Bearing Restriction: R lower extremity;L lower extremi Supervision    Skilled Therapy Provided: Patient stood with SBA and ambulated to BR, she was able to transfer with SBA and commode frame in place for UE supports. VC given for correct sequence.  Patient able to complete perineal hygiene light sponge bathing patients with this level of impairment often benefit from home at [place]. Recommend home with [address] [place] [address] and assistance for homemaking.  If homemaking assistance could be increased and patient is able to consistently manage transfers unassisted while in acute care

## 2019-02-13 NOTE — PLAN OF CARE
Patient/Family Goals    • Patient/Family Long Term Goal Progressing    • Patient/Family Short Term Goal Progressing        Pt in chair. Ambulated well with min assist and walker. Pain with position change mostly, norco tolerated for pain. RA, VSS.  PT/OT as

## 2019-02-13 NOTE — ED PROVIDER NOTES
Patient Seen in: [facility] Emergency Department    History   Patient presents with:  Pain (neurologic)    Stated Complaint: sacral pain    HPI    14-year-old female with known sacral insufficiency fractures who presents here with continued pain and clear bilaterally. No JVD or adenopathy. Lungs: Clear to auscultation bilaterally. No wheezes, rhonchi, or rales appreciated. No accessory muscle use noted for breathing. Cardiac: Regular rate and rhythm.   Normal S1 and 2 without murmurs or ectopy wan

## 2019-02-13 NOTE — CONSULTS
[facility] Pharmacy Note:  Pain Consult    Pepe Robledo is a 80year old female started on Dilaudid PCA by Dr. Georges Beebe. Pharmacy was consulted to review medication profile and to discontinue previously ordered narcotics and sedatives.     Medication profil

## 2019-02-13 NOTE — CONSULTS
[facility]  Report of Consultation    Ayanna Nathan Patient Status:  Observation    1933 MRN [id number]   [facility] 3SW-A Attending Raquel Fields MD   Hosp Day # 0 PCP Gerardine Bloch, MD     Date of Admissio Levothyroxine Sodium (SYNTHROID, LEVOTHROID) tab 75 mcg, 75 mcg, Oral, Before breakfast  •  lisinopril (PRINIVIL,ZESTRIL) tab 10 mg, 10 mg, Oral, Daily  •  PEG 3350 (MIRALAX) powder packet 17 g, 17 g, Oral, Daily PRN  •  temazepam (RESTORIL) cap 7.5 mg, 7

## 2019-02-14 VITALS
HEART RATE: 81 BPM | OXYGEN SATURATION: 97 % | RESPIRATION RATE: 18 BRPM | SYSTOLIC BLOOD PRESSURE: 147 MMHG | HEIGHT: 62 IN | DIASTOLIC BLOOD PRESSURE: 56 MMHG | WEIGHT: 154.31 LBS | BODY MASS INDEX: 28.39 KG/M2 | TEMPERATURE: 99 F

## 2019-02-14 LAB
ALBUMIN SERPL-MCNC: 2.8 G/DL (ref 3.4–5)
ALBUMIN/GLOB SERPL: 0.9 {RATIO} (ref 1–2)
ALP LIVER SERPL-CCNC: 156 U/L (ref 55–142)
ALT SERPL-CCNC: 13 U/L (ref 13–56)
ANION GAP SERPL CALC-SCNC: 5 MMOL/L (ref 0–18)
AST SERPL-CCNC: 18 U/L (ref 15–37)
BASOPHILS # BLD AUTO: 0.05 X10(3) UL (ref 0–0.2)
BASOPHILS NFR BLD AUTO: 1 %
BILIRUB SERPL-MCNC: 0.4 MG/DL (ref 0.1–2)
BUN BLD-MCNC: 11 MG/DL (ref 7–18)
BUN/CREAT SERPL: 18 (ref 10–20)
CALCIUM BLD-MCNC: 8.2 MG/DL (ref 8.5–10.1)
CHLORIDE SERPL-SCNC: 106 MMOL/L (ref 98–107)
CO2 SERPL-SCNC: 25 MMOL/L (ref 21–32)
CREAT BLD-MCNC: 0.61 MG/DL (ref 0.55–1.02)
DEPRECATED RDW RBC AUTO: 35.3 FL (ref 35.1–46.3)
EOSINOPHIL # BLD AUTO: 0.4 X10(3) UL (ref 0–0.7)
EOSINOPHIL NFR BLD AUTO: 8.1 %
ERYTHROCYTE [DISTWIDTH] IN BLOOD BY AUTOMATED COUNT: 16 % (ref 11–15)
GLOBULIN PLAS-MCNC: 3 G/DL (ref 2.8–4.4)
GLUCOSE BLD-MCNC: 82 MG/DL (ref 70–99)
HCT VFR BLD AUTO: 28.5 % (ref 35–48)
HGB BLD-MCNC: 9.2 G/DL (ref 12–16)
IMM GRANULOCYTES # BLD AUTO: 0.03 X10(3) UL (ref 0–1)
IMM GRANULOCYTES NFR BLD: 0.6 %
LYMPHOCYTES # BLD AUTO: 1.25 X10(3) UL (ref 1–4)
LYMPHOCYTES NFR BLD AUTO: 25.4 %
M PROTEIN MFR SERPL ELPH: 5.8 G/DL (ref 6.4–8.2)
MCH RBC QN AUTO: 20.5 PG (ref 26–34)
MCHC RBC AUTO-ENTMCNC: 32.3 G/DL (ref 31–37)
MCV RBC AUTO: 63.6 FL (ref 80–100)
MONOCYTES # BLD AUTO: 0.54 X10(3) UL (ref 0.1–1)
MONOCYTES NFR BLD AUTO: 11 %
NEUTROPHILS # BLD AUTO: 2.65 X10 (3) UL (ref 1.5–7.7)
NEUTROPHILS # BLD AUTO: 2.65 X10(3) UL (ref 1.5–7.7)
NEUTROPHILS NFR BLD AUTO: 53.9 %
OSMOLALITY SERPL CALC.SUM OF ELEC: 280 MOSM/KG (ref 275–295)
PLATELET # BLD AUTO: 364 10(3)UL (ref 150–450)
POTASSIUM SERPL-SCNC: 4.3 MMOL/L (ref 3.5–5.1)
RBC # BLD AUTO: 4.48 X10(6)UL (ref 3.8–5.3)
SODIUM SERPL-SCNC: 136 MMOL/L (ref 136–145)
WBC # BLD AUTO: 4.9 X10(3) UL (ref 4–11)

## 2019-02-14 PROCEDURE — 96372 THER/PROPH/DIAG INJ SC/IM: CPT

## 2019-02-14 PROCEDURE — 97535 SELF CARE MNGMENT TRAINING: CPT

## 2019-02-14 PROCEDURE — 85025 COMPLETE CBC W/AUTO DIFF WBC: CPT | Performed by: FAMILY MEDICINE

## 2019-02-14 PROCEDURE — 97530 THERAPEUTIC ACTIVITIES: CPT

## 2019-02-14 PROCEDURE — 80053 COMPREHEN METABOLIC PANEL: CPT | Performed by: FAMILY MEDICINE

## 2019-02-14 PROCEDURE — 97110 THERAPEUTIC EXERCISES: CPT

## 2019-02-14 PROCEDURE — 97116 GAIT TRAINING THERAPY: CPT

## 2019-02-14 RX ORDER — HYDROCODONE BITARTRATE AND ACETAMINOPHEN 5; 325 MG/1; MG/1
1 TABLET ORAL EVERY 4 HOURS PRN
Status: DISCONTINUED | OUTPATIENT
Start: 2019-02-14 | End: 2019-02-14

## 2019-02-14 RX ORDER — OXYCODONE HCL 10 MG/1
10 TABLET, FILM COATED, EXTENDED RELEASE ORAL EVERY 12 HOURS
Status: DISCONTINUED | OUTPATIENT
Start: 2019-02-14 | End: 2019-02-14

## 2019-02-14 RX ORDER — HYDROCODONE BITARTRATE AND ACETAMINOPHEN 5; 325 MG/1; MG/1
2 TABLET ORAL EVERY 4 HOURS PRN
Qty: 60 TABLET | Refills: 0 | Status: SHIPPED | OUTPATIENT
Start: 2019-02-14 | End: 2020-01-01

## 2019-02-14 RX ORDER — OXYCODONE HCL 10 MG/1
10 TABLET, FILM COATED, EXTENDED RELEASE ORAL EVERY 12 HOURS
Qty: 20 TABLET | Refills: 0 | Status: SHIPPED | OUTPATIENT
Start: 2019-02-14 | End: 2020-01-01

## 2019-02-14 RX ORDER — HYDROCODONE BITARTRATE AND ACETAMINOPHEN 5; 325 MG/1; MG/1
2 TABLET ORAL EVERY 4 HOURS PRN
Status: DISCONTINUED | OUTPATIENT
Start: 2019-02-14 | End: 2019-02-14

## 2019-02-14 RX ORDER — NALOXONE HYDROCHLORIDE 4 MG/.1ML
4 SPRAY, METERED NASAL AS NEEDED
Qty: 1 KIT | Refills: 0 | Status: SHIPPED | OUTPATIENT
Start: 2019-02-14 | End: 2020-01-01

## 2019-02-14 NOTE — PROGRESS NOTES
[facility]  Progress Note    Yogi Salvador Patient Status:  Observation    1933 MRN [id number]   [facility] 3SW-A Attending Benson Newsome MD   Hosp Day # 0 PCP Ubaldo Okeefe MD     Subjective:  Yogi Salvador is a(n) 8

## 2019-02-14 NOTE — CM/SW NOTE
02/14/19 1700   Discharge disposition   Expected discharge disposition Skilled Nurs   Name of Berny Malin   Patient is Discharged to a [address] Yes   Discharge transportation Other (comment)  ([employer] [facility]

## 2019-02-14 NOTE — OCCUPATIONAL THERAPY NOTE
OCCUPATIONAL THERAPY TREATMENT NOTE - INPATIENT     Room Number: 374/374-A  Session: 1/2  Number of Visits to Meet Established Goals: 2    Presenting Problem: (sacral insufficiency fractures, back pain)    History related to current admission:  Admitted vi body clothing?: A Little  -   Taking care of personal grooming such as brushing teeth?: None  -   Eating meals?: None    AM-PAC Score:  Score: 19  Approx Degree of Impairment: 42.8%  Standardized Score (AM-PAC Scale): 40.22  CMS Modifier (G-Code): JANICE GONZALEZ eval/education; Compensatory technique education  Rehab Potential : Good  Frequency (Obs): 5x/week    OT Goals:   ADL GOALS   Patient will perform Lower Body Dressing with supervision --ongoing  Patient will perform Toileting with supervision --ongoing

## 2019-02-14 NOTE — PHYSICAL THERAPY NOTE
PHYSICAL THERAPY TREATMENT NOTE - INPATIENT    Room Number: 374/374-A     Session: 1   Number of Visits to Meet Established Goals: 5    Presenting Problem: Sacral Insufficiency Fx    History related to current admission: Patient admitted on 2/12/19 with RADHA +    ACTIVITY TOLERANCE                         O2 WALK                    AM-PAC '6-Clicks' INPATIENT SHORT FORM - BASIC MOBILITY  How much difficulty does the patient currently have. ..  -   Turning over in bed (including adjusting bedclothes, sheets and EXERCISES  Lower Extremity Alternating marching  Ankle pumps  Heel raises  LAQ         Position Sitting     Repetitions   10   Sets   1     Patient End of Session: Up in chair;Needs met;Call light within reach;RN aware of session/findings; All patient quest

## 2019-02-14 NOTE — DISCHARGE SUMMARY
[facility]  Discharge Summary    Izabela Garduno Patient Status:  Observation    1933 MRN [id number]   [facility] 3SW-A Attending Nelma Gaucher, MD   [facility] Day # 0 PCP Elvia Garrison MD     2019  9:35 PM    Date of Ad discharge planning  Patient is accepted at [facility] for rehabilitation  Currently she was examined in her room  Her son Sol Damon is at bedside  Nurse is at bedside  Patient is sitting in the chair  She is complaining of pain which is moderate to s will see her back in office post-discharge from Rehab.       Consultations: Pain management    Disposition: Home or Self Care    Discharge Condition: Stable    Discharge Medications: Current Discharge Medication List    START taking these medications    Oxy

## 2019-02-14 NOTE — PLAN OF CARE
AM medicationa and POC discussed with patient and son-  service brought in room and both persons declined. Insurance auth obtained for [employer]. SW informed son.    Call placed to Dr. Dillon Schaefer at 0089 requesting eval for discharge, immediate telep

## 2019-02-14 NOTE — CM/SW NOTE
Message from [place] that pt is approved for [facility]/SNF stay; [id number]. KELLI sent info for [employer] as requested to [employer]. SW updated pt's son, he wants pt's MD to be contacted. Son aware of d/c this evening.     Spoke with RN- she contacted MD, he

## 2019-02-15 NOTE — CM/SW NOTE
KELLI contacted [facility] again to confirm transport time and provider. Spoke with Nevin- she notes that they do not have all needed info. SW provided as requested. Pauline called RN re: transport. KELLI spoke with her.   She notes that they are working

## 2019-08-10 NOTE — ED NOTES
Rounded on patient. Patient aware the only pending test is UA. Pt states she used the restroom while in CT department and does not have to urinate at this time. Pt does not want straight cath.

## 2019-08-10 NOTE — ED PROVIDER NOTES
Patient Seen in: [facility] Emergency Department    History   Patient presents with:  Dizziness (neurologic)    Stated Complaint:     HPI    Patient presents with a 3-day history of dizziness and mild headache.   The patient describes the dizziness a heard.  Pulmonary/Chest: Effort normal and breath sounds normal. No respiratory distress. Abdominal: Soft. Bowel sounds are normal. There is no tenderness. There is no rebound. Musculoskeletal: Normal range of motion.  She exhibits no edema or tendernes 1341  ------------------------------------------------------------  Time: 08/10 1024  Value: CBC With Differential With Platelet(!)  Comment: Unremarkable    ------------------------------------------------------------  Time: 08/10 1109  Value: CT BRAIN OR

## 2020-01-01 ENCOUNTER — LAB ENCOUNTER (OUTPATIENT)
Dept: LAB | Facility: HOSPITAL | Age: 85
DRG: 207 | End: 2020-01-01
Attending: FAMILY MEDICINE
Payer: MEDICARE

## 2020-01-01 ENCOUNTER — APPOINTMENT (OUTPATIENT)
Dept: ULTRASOUND IMAGING | Facility: HOSPITAL | Age: 85
DRG: 207 | End: 2020-01-01
Attending: INTERNAL MEDICINE
Payer: MEDICARE

## 2020-01-01 ENCOUNTER — APPOINTMENT (OUTPATIENT)
Dept: GENERAL RADIOLOGY | Facility: HOSPITAL | Age: 85
DRG: 207 | End: 2020-01-01
Attending: NURSE PRACTITIONER
Payer: MEDICARE

## 2020-01-01 ENCOUNTER — APPOINTMENT (OUTPATIENT)
Dept: GENERAL RADIOLOGY | Facility: HOSPITAL | Age: 85
DRG: 207 | End: 2020-01-01
Attending: INTERNAL MEDICINE
Payer: MEDICARE

## 2020-01-01 ENCOUNTER — HOSPITAL ENCOUNTER (OUTPATIENT)
Facility: HOSPITAL | Age: 85
Setting detail: OBSERVATION
Discharge: NURSING, REHAB OR LONG-TERM CARE | End: 2020-01-01
Attending: EMERGENCY MEDICINE | Admitting: HOSPITALIST
Payer: MEDICARE

## 2020-01-01 ENCOUNTER — APPOINTMENT (OUTPATIENT)
Dept: GENERAL RADIOLOGY | Facility: HOSPITAL | Age: 85
End: 2020-01-01
Attending: EMERGENCY MEDICINE
Payer: MEDICARE

## 2020-01-01 ENCOUNTER — APPOINTMENT (OUTPATIENT)
Dept: CT IMAGING | Facility: HOSPITAL | Age: 85
DRG: 207 | End: 2020-01-01
Attending: INTERNAL MEDICINE
Payer: MEDICARE

## 2020-01-01 ENCOUNTER — APPOINTMENT (OUTPATIENT)
Dept: GENERAL RADIOLOGY | Facility: HOSPITAL | Age: 85
DRG: 207 | End: 2020-01-01
Attending: EMERGENCY MEDICINE
Payer: MEDICARE

## 2020-01-01 ENCOUNTER — TELEPHONE (OUTPATIENT)
Dept: INTERNAL MEDICINE CLINIC | Facility: CLINIC | Age: 85
End: 2020-01-01

## 2020-01-01 ENCOUNTER — HOSPITAL ENCOUNTER (INPATIENT)
Facility: HOSPITAL | Age: 85
LOS: 14 days | DRG: 207 | End: 2020-01-01
Attending: EMERGENCY MEDICINE | Admitting: HOSPITALIST
Payer: MEDICARE

## 2020-01-01 ENCOUNTER — APPOINTMENT (OUTPATIENT)
Dept: MRI IMAGING | Facility: HOSPITAL | Age: 85
End: 2020-01-01
Attending: ANESTHESIOLOGY
Payer: MEDICARE

## 2020-01-01 ENCOUNTER — APPOINTMENT (OUTPATIENT)
Dept: MRI IMAGING | Facility: HOSPITAL | Age: 85
End: 2020-01-01
Attending: NEUROLOGICAL SURGERY
Payer: MEDICARE

## 2020-01-01 VITALS
TEMPERATURE: 97 F | BODY MASS INDEX: 25.71 KG/M2 | SYSTOLIC BLOOD PRESSURE: 92 MMHG | OXYGEN SATURATION: 57 % | DIASTOLIC BLOOD PRESSURE: 56 MMHG | HEIGHT: 60 IN | WEIGHT: 130.94 LBS

## 2020-01-01 VITALS
BODY MASS INDEX: 30.23 KG/M2 | OXYGEN SATURATION: 99 % | HEIGHT: 59 IN | RESPIRATION RATE: 16 BRPM | HEART RATE: 76 BPM | WEIGHT: 149.94 LBS | TEMPERATURE: 98 F | DIASTOLIC BLOOD PRESSURE: 48 MMHG | SYSTOLIC BLOOD PRESSURE: 102 MMHG

## 2020-01-01 DIAGNOSIS — S22.32XA CLOSED FRACTURE OF ONE RIB OF LEFT SIDE, INITIAL ENCOUNTER: Primary | ICD-10-CM

## 2020-01-01 DIAGNOSIS — E87.1 HYPONATREMIA: ICD-10-CM

## 2020-01-01 DIAGNOSIS — U07.1 COVID-19 VIRUS INFECTION: Primary | ICD-10-CM

## 2020-01-01 DIAGNOSIS — E86.0 DEHYDRATION: ICD-10-CM

## 2020-01-01 DIAGNOSIS — R05.9 COUGH: ICD-10-CM

## 2020-01-01 DIAGNOSIS — D64.9 ANEMIA, UNSPECIFIED TYPE: ICD-10-CM

## 2020-01-01 DIAGNOSIS — S32.000A COMPRESSION FRACTURE OF LUMBAR VERTEBRA, INITIAL ENCOUNTER, UNSPECIFIED LUMBAR VERTEBRAL LEVEL: ICD-10-CM

## 2020-01-01 PROCEDURE — 71045 X-RAY EXAM CHEST 1 VIEW: CPT | Performed by: INTERNAL MEDICINE

## 2020-01-01 PROCEDURE — 99233 SBSQ HOSP IP/OBS HIGH 50: CPT | Performed by: INTERNAL MEDICINE

## 2020-01-01 PROCEDURE — 99291 CRITICAL CARE FIRST HOUR: CPT | Performed by: INTERNAL MEDICINE

## 2020-01-01 PROCEDURE — 99223 1ST HOSP IP/OBS HIGH 75: CPT | Performed by: INTERNAL MEDICINE

## 2020-01-01 PROCEDURE — 99223 1ST HOSP IP/OBS HIGH 75: CPT | Performed by: HOSPITALIST

## 2020-01-01 PROCEDURE — 03HY32Z INSERTION OF MONITORING DEVICE INTO UPPER ARTERY, PERCUTANEOUS APPROACH: ICD-10-PCS | Performed by: ANESTHESIOLOGY

## 2020-01-01 PROCEDURE — 99233 SBSQ HOSP IP/OBS HIGH 50: CPT | Performed by: HOSPITALIST

## 2020-01-01 PROCEDURE — 99232 SBSQ HOSP IP/OBS MODERATE 35: CPT | Performed by: HOSPITALIST

## 2020-01-01 PROCEDURE — 05H533Z INSERTION OF INFUSION DEVICE INTO RIGHT SUBCLAVIAN VEIN, PERCUTANEOUS APPROACH: ICD-10-PCS | Performed by: HOSPITALIST

## 2020-01-01 PROCEDURE — 72100 X-RAY EXAM L-S SPINE 2/3 VWS: CPT | Performed by: EMERGENCY MEDICINE

## 2020-01-01 PROCEDURE — 99232 SBSQ HOSP IP/OBS MODERATE 35: CPT | Performed by: INTERNAL MEDICINE

## 2020-01-01 PROCEDURE — 71045 X-RAY EXAM CHEST 1 VIEW: CPT | Performed by: NURSE PRACTITIONER

## 2020-01-01 PROCEDURE — 71045 X-RAY EXAM CHEST 1 VIEW: CPT | Performed by: EMERGENCY MEDICINE

## 2020-01-01 PROCEDURE — 99239 HOSP IP/OBS DSCHRG MGMT >30: CPT | Performed by: HOSPITALIST

## 2020-01-01 PROCEDURE — 72146 MRI CHEST SPINE W/O DYE: CPT | Performed by: ANESTHESIOLOGY

## 2020-01-01 PROCEDURE — 36620 INSERTION CATHETER ARTERY: CPT | Performed by: NURSE PRACTITIONER

## 2020-01-01 PROCEDURE — 71101 X-RAY EXAM UNILAT RIBS/CHEST: CPT | Performed by: EMERGENCY MEDICINE

## 2020-01-01 PROCEDURE — 0BH17EZ INSERTION OF ENDOTRACHEAL AIRWAY INTO TRACHEA, VIA NATURAL OR ARTIFICIAL OPENING: ICD-10-PCS | Performed by: ANESTHESIOLOGY

## 2020-01-01 PROCEDURE — 99225 SUBSEQUENT OBSERVATION CARE: CPT | Performed by: HOSPITALIST

## 2020-01-01 PROCEDURE — 99217 OBSERVATION CARE DISCHARGE: CPT | Performed by: HOSPITALIST

## 2020-01-01 PROCEDURE — 74176 CT ABD & PELVIS W/O CONTRAST: CPT | Performed by: INTERNAL MEDICINE

## 2020-01-01 PROCEDURE — 3E033XZ INTRODUCTION OF VASOPRESSOR INTO PERIPHERAL VEIN, PERCUTANEOUS APPROACH: ICD-10-PCS | Performed by: HOSPITALIST

## 2020-01-01 PROCEDURE — 72148 MRI LUMBAR SPINE W/O DYE: CPT | Performed by: NEUROLOGICAL SURGERY

## 2020-01-01 PROCEDURE — 99219 INITIAL OBSERVATION CARE,LEVL II: CPT | Performed by: HOSPITALIST

## 2020-01-01 PROCEDURE — 5A1955Z RESPIRATORY VENTILATION, GREATER THAN 96 CONSECUTIVE HOURS: ICD-10-PCS | Performed by: ANESTHESIOLOGY

## 2020-01-01 PROCEDURE — 93970 EXTREMITY STUDY: CPT | Performed by: INTERNAL MEDICINE

## 2020-01-01 RX ORDER — HALOPERIDOL 5 MG/ML
5 INJECTION INTRAMUSCULAR ONCE
Status: COMPLETED | OUTPATIENT
Start: 2020-01-01 | End: 2020-01-01

## 2020-01-01 RX ORDER — GLYCOPYRROLATE 0.2 MG/ML
0.4 INJECTION, SOLUTION INTRAMUSCULAR; INTRAVENOUS ONCE
Status: COMPLETED | OUTPATIENT
Start: 2020-01-01 | End: 2020-01-01

## 2020-01-01 RX ORDER — DILTIAZEM HYDROCHLORIDE 5 MG/ML
INJECTION INTRAVENOUS
Status: DISCONTINUED
Start: 2020-01-01 | End: 2020-01-01 | Stop reason: WASHOUT

## 2020-01-01 RX ORDER — LORAZEPAM 2 MG/ML
1 INJECTION INTRAMUSCULAR EVERY 30 MIN PRN
Status: DISCONTINUED | OUTPATIENT
Start: 2020-01-01 | End: 2020-01-01

## 2020-01-01 RX ORDER — POTASSIUM CHLORIDE 1.5 G/1.77G
40 POWDER, FOR SOLUTION ORAL EVERY 4 HOURS
Status: COMPLETED | OUTPATIENT
Start: 2020-01-01 | End: 2020-01-01

## 2020-01-01 RX ORDER — TOLVAPTAN 15 MG/1
15 TABLET ORAL ONCE
Status: DISCONTINUED | OUTPATIENT
Start: 2020-01-01 | End: 2020-01-01

## 2020-01-01 RX ORDER — DEXAMETHASONE SODIUM PHOSPHATE 4 MG/ML
20 VIAL (ML) INJECTION DAILY
Status: DISCONTINUED | OUTPATIENT
Start: 2020-01-01 | End: 2020-01-01 | Stop reason: SDUPTHER

## 2020-01-01 RX ORDER — SODIUM CHLORIDE 9 MG/ML
INJECTION, SOLUTION INTRAVENOUS CONTINUOUS
Status: ACTIVE | OUTPATIENT
Start: 2020-01-01 | End: 2020-01-01

## 2020-01-01 RX ORDER — LORAZEPAM 2 MG/ML
1 INJECTION INTRAMUSCULAR ONCE
Status: COMPLETED | OUTPATIENT
Start: 2020-01-01 | End: 2020-01-01

## 2020-01-01 RX ORDER — FUROSEMIDE 10 MG/ML
40 INJECTION INTRAMUSCULAR; INTRAVENOUS ONCE
Status: COMPLETED | OUTPATIENT
Start: 2020-01-01 | End: 2020-01-01

## 2020-01-01 RX ORDER — KETOROLAC TROMETHAMINE 15 MG/ML
15 INJECTION, SOLUTION INTRAMUSCULAR; INTRAVENOUS EVERY 6 HOURS
Status: COMPLETED | OUTPATIENT
Start: 2020-01-01 | End: 2020-01-01

## 2020-01-01 RX ORDER — BENZONATATE 100 MG/1
100 CAPSULE ORAL 3 TIMES DAILY PRN
Status: DISCONTINUED | OUTPATIENT
Start: 2020-01-01 | End: 2020-01-01

## 2020-01-01 RX ORDER — DEXMEDETOMIDINE HYDROCHLORIDE 4 UG/ML
INJECTION, SOLUTION INTRAVENOUS
Status: COMPLETED
Start: 2020-01-01 | End: 2020-01-01

## 2020-01-01 RX ORDER — METOPROLOL TARTRATE 5 MG/5ML
5 INJECTION INTRAVENOUS ONCE
Status: COMPLETED | OUTPATIENT
Start: 2020-01-01 | End: 2020-01-01

## 2020-01-01 RX ORDER — HYDROCODONE BITARTRATE AND ACETAMINOPHEN 5; 325 MG/1; MG/1
1-2 TABLET ORAL EVERY 6 HOURS PRN
Qty: 10 TABLET | Refills: 0 | Status: SHIPPED | OUTPATIENT
Start: 2020-01-01 | End: 2020-01-01

## 2020-01-01 RX ORDER — MORPHINE SULFATE 4 MG/ML
0.5 INJECTION, SOLUTION INTRAMUSCULAR; INTRAVENOUS EVERY 4 HOURS PRN
Status: DISCONTINUED | OUTPATIENT
Start: 2020-01-01 | End: 2020-01-01

## 2020-01-01 RX ORDER — MELATONIN
325 2 TIMES DAILY WITH MEALS
Qty: 60 TABLET | Refills: 0 | Status: SHIPPED | OUTPATIENT
Start: 2020-01-01 | End: 2020-01-01

## 2020-01-01 RX ORDER — SODIUM PHOSPHATE, DIBASIC AND SODIUM PHOSPHATE, MONOBASIC 7; 19 G/133ML; G/133ML
1 ENEMA RECTAL ONCE AS NEEDED
Status: DISCONTINUED | OUTPATIENT
Start: 2020-01-01 | End: 2020-01-01

## 2020-01-01 RX ORDER — MIDAZOLAM HYDROCHLORIDE 1 MG/ML
2 INJECTION INTRAMUSCULAR; INTRAVENOUS EVERY 5 MIN PRN
Status: DISCONTINUED | OUTPATIENT
Start: 2020-01-01 | End: 2020-01-01

## 2020-01-01 RX ORDER — ACETAMINOPHEN 325 MG/1
650 TABLET ORAL EVERY 6 HOURS PRN
Status: DISCONTINUED | OUTPATIENT
Start: 2020-01-01 | End: 2020-01-01

## 2020-01-01 RX ORDER — ONDANSETRON 2 MG/ML
4 INJECTION INTRAMUSCULAR; INTRAVENOUS EVERY 6 HOURS PRN
Status: DISCONTINUED | OUTPATIENT
Start: 2020-01-01 | End: 2020-01-01

## 2020-01-01 RX ORDER — FUROSEMIDE 10 MG/ML
20 INJECTION INTRAMUSCULAR; INTRAVENOUS
Status: DISCONTINUED | OUTPATIENT
Start: 2020-01-01 | End: 2020-01-01

## 2020-01-01 RX ORDER — POLYETHYLENE GLYCOL 3350 17 G/17G
17 POWDER, FOR SOLUTION ORAL DAILY
Status: DISCONTINUED | OUTPATIENT
Start: 2020-01-01 | End: 2020-01-01

## 2020-01-01 RX ORDER — LEVOTHYROXINE SODIUM 0.07 MG/1
75 TABLET ORAL DAILY
Status: DISCONTINUED | OUTPATIENT
Start: 2020-01-01 | End: 2020-01-01

## 2020-01-01 RX ORDER — POTASSIUM CHLORIDE 20 MEQ/1
40 TABLET, EXTENDED RELEASE ORAL EVERY 4 HOURS
Status: DISCONTINUED | OUTPATIENT
Start: 2020-01-01 | End: 2020-01-01

## 2020-01-01 RX ORDER — TRAMADOL HYDROCHLORIDE 50 MG/1
50 TABLET ORAL EVERY 8 HOURS PRN
Status: DISCONTINUED | OUTPATIENT
Start: 2020-01-01 | End: 2020-01-01

## 2020-01-01 RX ORDER — FUROSEMIDE 10 MG/ML
20 INJECTION INTRAMUSCULAR; INTRAVENOUS ONCE
Status: COMPLETED | OUTPATIENT
Start: 2020-01-01 | End: 2020-01-01

## 2020-01-01 RX ORDER — DEXMEDETOMIDINE HYDROCHLORIDE 4 UG/ML
INJECTION, SOLUTION INTRAVENOUS CONTINUOUS
Status: DISCONTINUED | OUTPATIENT
Start: 2020-01-01 | End: 2020-01-01

## 2020-01-01 RX ORDER — TOLVAPTAN 30 MG/1
30 TABLET ORAL ONCE
Status: COMPLETED | OUTPATIENT
Start: 2020-01-01 | End: 2020-01-01

## 2020-01-01 RX ORDER — HEPARIN SODIUM 5000 [USP'U]/ML
5000 INJECTION, SOLUTION INTRAVENOUS; SUBCUTANEOUS EVERY 8 HOURS SCHEDULED
Status: DISCONTINUED | OUTPATIENT
Start: 2020-01-01 | End: 2020-01-01

## 2020-01-01 RX ORDER — DILTIAZEM HYDROCHLORIDE 60 MG/1
60 TABLET, FILM COATED ORAL AS NEEDED
Status: DISCONTINUED | OUTPATIENT
Start: 2020-01-01 | End: 2020-01-01

## 2020-01-01 RX ORDER — PYRIDOXINE HCL (VITAMIN B6) 50 MG
100 TABLET ORAL
COMMUNITY

## 2020-01-01 RX ORDER — POLYETHYLENE GLYCOL 3350 17 G/17G
17 POWDER, FOR SOLUTION ORAL DAILY PRN
Status: DISCONTINUED | OUTPATIENT
Start: 2020-01-01 | End: 2020-01-01

## 2020-01-01 RX ORDER — FUROSEMIDE 10 MG/ML
20 INJECTION INTRAMUSCULAR; INTRAVENOUS
Status: COMPLETED | OUTPATIENT
Start: 2020-01-01 | End: 2020-01-01

## 2020-01-01 RX ORDER — AMLODIPINE BESYLATE 5 MG/1
5 TABLET ORAL DAILY
Status: DISCONTINUED | OUTPATIENT
Start: 2020-01-01 | End: 2020-01-01

## 2020-01-01 RX ORDER — MECLIZINE HYDROCHLORIDE 25 MG/1
25 TABLET ORAL 3 TIMES DAILY PRN
Status: DISCONTINUED | OUTPATIENT
Start: 2020-01-01 | End: 2020-01-01

## 2020-01-01 RX ORDER — SODIUM CHLORIDE 9 MG/ML
INJECTION, SOLUTION INTRAVENOUS ONCE
Status: COMPLETED | OUTPATIENT
Start: 2020-01-01 | End: 2020-01-01

## 2020-01-01 RX ORDER — MORPHINE SULFATE 4 MG/ML
2 INJECTION, SOLUTION INTRAMUSCULAR; INTRAVENOUS EVERY 30 MIN PRN
Status: DISCONTINUED | OUTPATIENT
Start: 2020-01-01 | End: 2020-01-01

## 2020-01-01 RX ORDER — HYDROCODONE BITARTRATE AND ACETAMINOPHEN 5; 325 MG/1; MG/1
1 TABLET ORAL EVERY 6 HOURS PRN
Status: DISCONTINUED | OUTPATIENT
Start: 2020-01-01 | End: 2020-01-01

## 2020-01-01 RX ORDER — ENOXAPARIN SODIUM 100 MG/ML
0.5 INJECTION SUBCUTANEOUS EVERY 12 HOURS SCHEDULED
Status: DISCONTINUED | OUTPATIENT
Start: 2020-01-01 | End: 2020-01-01

## 2020-01-01 RX ORDER — HYDROCODONE BITARTRATE AND ACETAMINOPHEN 10; 325 MG/1; MG/1
1 TABLET ORAL EVERY 4 HOURS PRN
Status: DISCONTINUED | OUTPATIENT
Start: 2020-01-01 | End: 2020-01-01

## 2020-01-01 RX ORDER — DOCUSATE SODIUM 100 MG/1
100 CAPSULE, LIQUID FILLED ORAL 2 TIMES DAILY
Status: DISCONTINUED | OUTPATIENT
Start: 2020-01-01 | End: 2020-01-01

## 2020-01-01 RX ORDER — HYDROCODONE BITARTRATE AND ACETAMINOPHEN 5; 325 MG/1; MG/1
2 TABLET ORAL ONCE
Status: COMPLETED | OUTPATIENT
Start: 2020-01-01 | End: 2020-01-01

## 2020-01-01 RX ORDER — LISINOPRIL 10 MG/1
10 TABLET ORAL DAILY
Status: DISCONTINUED | OUTPATIENT
Start: 2020-01-01 | End: 2020-01-01

## 2020-01-01 RX ORDER — MORPHINE SULFATE 4 MG/ML
2 INJECTION, SOLUTION INTRAMUSCULAR; INTRAVENOUS
Status: DISCONTINUED | OUTPATIENT
Start: 2020-01-01 | End: 2020-01-01

## 2020-01-01 RX ORDER — TRAMADOL HYDROCHLORIDE 50 MG/1
50 TABLET ORAL EVERY 8 HOURS PRN
Status: ON HOLD | COMMUNITY
End: 2020-01-01

## 2020-01-01 RX ORDER — HYDROCODONE BITARTRATE AND ACETAMINOPHEN 5; 325 MG/1; MG/1
1 TABLET ORAL EVERY 6 HOURS PRN
COMMUNITY
End: 2020-01-01

## 2020-01-01 RX ORDER — ACETAMINOPHEN 500 MG
1000 TABLET ORAL ONCE
Status: COMPLETED | OUTPATIENT
Start: 2020-01-01 | End: 2020-01-01

## 2020-01-01 RX ORDER — BISACODYL 10 MG
10 SUPPOSITORY, RECTAL RECTAL
Status: DISCONTINUED | OUTPATIENT
Start: 2020-01-01 | End: 2020-01-01

## 2020-01-01 RX ORDER — DEXAMETHASONE SODIUM PHOSPHATE 4 MG/ML
10 VIAL (ML) INJECTION DAILY
Status: DISCONTINUED | OUTPATIENT
Start: 2020-01-01 | End: 2020-01-01

## 2020-01-01 RX ORDER — TRAMADOL HYDROCHLORIDE 50 MG/1
50 TABLET ORAL EVERY 6 HOURS PRN
Status: DISCONTINUED | OUTPATIENT
Start: 2020-01-01 | End: 2020-01-01

## 2020-01-01 RX ORDER — CHLORHEXIDINE GLUCONATE 0.12 MG/ML
15 RINSE ORAL
Status: DISCONTINUED | OUTPATIENT
Start: 2020-01-01 | End: 2020-01-01

## 2020-01-01 RX ORDER — ACETAMINOPHEN AND CODEINE PHOSPHATE 300; 30 MG/1; MG/1
1 TABLET ORAL EVERY 4 HOURS PRN
Status: DISCONTINUED | OUTPATIENT
Start: 2020-01-01 | End: 2020-01-01

## 2020-01-01 RX ORDER — ENOXAPARIN SODIUM 100 MG/ML
40 INJECTION SUBCUTANEOUS DAILY
Status: DISCONTINUED | OUTPATIENT
Start: 2020-01-01 | End: 2020-01-01

## 2020-01-01 RX ORDER — POLYETHYLENE GLYCOL 3350 17 G/17G
17 POWDER, FOR SOLUTION ORAL DAILY
COMMUNITY

## 2020-01-01 RX ORDER — MELATONIN
325
COMMUNITY

## 2020-01-01 RX ORDER — LORAZEPAM 2 MG/ML
1 INJECTION INTRAMUSCULAR EVERY 4 HOURS PRN
Status: DISCONTINUED | OUTPATIENT
Start: 2020-01-01 | End: 2020-01-01

## 2020-01-01 RX ORDER — HYDROXYCHLOROQUINE SULFATE 200 MG/1
200 TABLET, FILM COATED ORAL 2 TIMES DAILY
Status: COMPLETED | OUTPATIENT
Start: 2020-01-01 | End: 2020-01-01

## 2020-01-01 RX ORDER — KETOROLAC TROMETHAMINE 15 MG/ML
15 INJECTION, SOLUTION INTRAMUSCULAR; INTRAVENOUS EVERY 6 HOURS
Status: DISCONTINUED | OUTPATIENT
Start: 2020-01-01 | End: 2020-01-01 | Stop reason: SDUPTHER

## 2020-01-01 RX ORDER — TRAMADOL HYDROCHLORIDE 50 MG/1
50 TABLET ORAL EVERY 8 HOURS PRN
COMMUNITY

## 2020-01-01 RX ORDER — PANTOPRAZOLE SODIUM 40 MG/1
40 TABLET, DELAYED RELEASE ORAL
Status: DISCONTINUED | OUTPATIENT
Start: 2020-01-01 | End: 2020-01-01

## 2020-01-01 RX ORDER — POTASSIUM CHLORIDE 20 MEQ/1
40 TABLET, EXTENDED RELEASE ORAL EVERY 4 HOURS
Status: COMPLETED | OUTPATIENT
Start: 2020-01-01 | End: 2020-01-01

## 2020-01-01 RX ORDER — POTASSIUM CHLORIDE 1.5 G/1.77G
40 POWDER, FOR SOLUTION ORAL EVERY 4 HOURS
Status: DISCONTINUED | OUTPATIENT
Start: 2020-01-01 | End: 2020-01-01

## 2020-01-01 RX ORDER — FUROSEMIDE 10 MG/ML
20 INJECTION INTRAMUSCULAR; INTRAVENOUS ONCE
Status: DISCONTINUED | OUTPATIENT
Start: 2020-01-01 | End: 2020-01-01

## 2020-01-01 RX ORDER — GLYCOPYRROLATE 0.2 MG/ML
0.2 INJECTION, SOLUTION INTRAMUSCULAR; INTRAVENOUS
Status: DISCONTINUED | OUTPATIENT
Start: 2020-01-01 | End: 2020-01-01

## 2020-01-01 RX ORDER — ACETAMINOPHEN 500 MG
500 TABLET ORAL 3 TIMES DAILY
Status: DISCONTINUED | OUTPATIENT
Start: 2020-01-01 | End: 2020-01-01

## 2020-01-01 RX ORDER — MORPHINE SULFATE 4 MG/ML
2 INJECTION, SOLUTION INTRAMUSCULAR; INTRAVENOUS EVERY 10 MIN PRN
Status: DISCONTINUED | OUTPATIENT
Start: 2020-01-01 | End: 2020-01-01

## 2020-01-01 RX ORDER — LEVOTHYROXINE SODIUM 0.07 MG/1
75 TABLET ORAL
Status: DISCONTINUED | OUTPATIENT
Start: 2020-01-01 | End: 2020-01-01

## 2020-01-01 RX ORDER — FUROSEMIDE 10 MG/ML
20 INJECTION INTRAMUSCULAR; INTRAVENOUS DAILY
Status: DISCONTINUED | OUTPATIENT
Start: 2020-01-01 | End: 2020-01-01

## 2020-03-17 PROBLEM — S22.32XA CLOSED FRACTURE OF ONE RIB OF LEFT SIDE, INITIAL ENCOUNTER: Status: ACTIVE | Noted: 2020-01-01

## 2020-03-17 PROBLEM — S22.32XA CLOSED FRACTURE OF ONE RIB OF LEFT SIDE: Status: ACTIVE | Noted: 2020-01-01

## 2020-03-17 PROBLEM — S32.000A COMPRESSION FRACTURE OF LUMBAR VERTEBRA, INITIAL ENCOUNTER, UNSPECIFIED LUMBAR VERTEBRAL LEVEL: Status: ACTIVE | Noted: 2020-01-01

## 2020-03-17 NOTE — CM/SW NOTE
Informed daughter in law that if patient goes to [place] she will not be able to have any visitors. She agrees with this. Waiting on placement.

## 2020-03-17 NOTE — H&P
[facility]  History and Physical     Rexie Crimes Patient Status:  Emergency    1933 MRN [id number]   Location [address] Attending Qian Chanel MD   Hosp Day # 0 PCP Leonila Nj MD     Chief Compl mg by Nasal route as needed. If patient remains unresponsive, repeat dose in other nostril 2-5 minutes after first dose., Disp: 1 kit, Rfl: 0  lisinopril 10 MG Oral Tab, Take 10 mg by mouth daily. , Disp: , Rfl:   Levothyroxine Sodium 75 MCG Oral Tab, Take TROP, CK in the last 168 hours. Imaging: Imaging data reviewed in Epic. ASSESSMENT / PLAN:     1. 79 yo with ac to subacute compression fx T12,L1,L2,L3  -TLSO brace  -Pain control  -PT  -eval for vertebroplasty  2. HTN-continue home meds controlled

## 2020-03-17 NOTE — ED PROVIDER NOTES
Patient Seen in: [facility] Emergency Department      History   Patient presents with:  Back Pain    Stated Complaint: back pain    HPI    Patient is an 80-year-old female presents with left-sided chest wall pain.   Patient is a poor historian so by wall: Significant pain with any movement localized in the left lower rib cage. Cardiac: No tachycardia. No murmurs. Regular rate and rhythm. Abdomen: Diffuse tenderness about guarding or rebound  Extremities: No clubbing/cyanosis/edema.   Skin: No rashe

## 2020-03-17 NOTE — PHYSICAL THERAPY NOTE
PHYSICAL THERAPY HIP EVALUATION - INPATIENT     Room Number: A5/A5  Evaluation Date: 3/17/2020  Type of Evaluation: Initial  Physician Order: PT Eval and Treat    Presenting Problem: fractured rib s/p fall at home.    Reason for Therapy: Mobility Dysfunctio None    Prior Level of [place]: Per MICHAEL, 4 weeks ago, pt amb with supervision of her 80year old spouse. Pt use of RW at the time. MICHAEL states that fear of falling seems to have started about 2 weeks ago as well as the hesitancy of ambulating.  Has 24/7 slide. Otherwise, pt is fighting me moving her legs and resisting me lifting them. Lower extremity strength is at least 3/5 for LLE heel slide, and otherwise at least 4/5 for pressing legs down into bed.      BALANCE  Static Sitting: Not tested  Dynamic addressed; Family present(daughter in law present)    ASSESSMENT   Patient is a 80year old female admitted on 3/17/2020 for fractured rib after fall at home.   Pertinent comorbidities and personal factors impacting therapy include back pain that started abo

## 2020-03-17 NOTE — CM/SW NOTE
Spoke to [place] (daughter in law) about patient who continues to have falls at home. Her  and her work fulltime so they're unable to be with the patient all the time.  Pt is at home by herself with her 80year old  who is not able to manage h

## 2020-03-17 NOTE — CONSULTS
[facility]  Neurosurgery Consult    Arturo Kim Patient Status:  Emergency    1933 MRN [id number]   Location [address] [facility] Attending Lc Jeffers MD   Hosp Day # 0 PCP Elvia Garrison MD     [facility] HISTORY:   reports that she has never smoked.  She has never used smokeless tobacco.    ALLERGIES:  No Known Allergies    MEDICATIONS:  (Not in a hospital admission)    traMADol HCl (ULTRAM) tab 50 mg, 50 mg, Oral, Q6H PRN  HYDROcodone-acetaminophen (NORCO) subacute      Plan:  Discussed with Dr. Fahad Arceo  No acute spine surgical intervention. Pain is poorly controlled at this time. Recommend admitting pt to hospital for pain control.    to fit for and deliver TLSO brace- Btrace does not need to be worn

## 2020-03-18 NOTE — PLAN OF CARE
RECD ALERT ,AWAKE, ORIENTED SPEAK GUJARATI USE INTERNATIONAL  ID [id number] ,SPOKE TO SON ALSO AWARE OF PLAN OF CARE. KEEP NPO HOLD HEPARIN SQ FOR POSSIBLE KYPO IF PAIN  NOT CONTROL WITH ORAL PAIN MEDS  AND WAITING FOR TLSO BRACE.  BR FOR

## 2020-03-18 NOTE — PLAN OF CARE
Pt. Admitted on 03-17-20 for L3 compression fx and L rib fx from fall. A&O x4, speaks [place] with little understanding of English. Pain level is well controlled, gave Norco prior to MRI, has not complained of pain since. Bedrest overnight.  Denies numbn

## 2020-03-18 NOTE — CM/SW NOTE
03/18/20 0700   CM/SW Referral Data   Referral Source Social Work (self-referral)   Reason for Referral Discharge planning       HOME SITUATION  Type of Home: House   Home Layout: Two level; Able to live on main level  Stairs to Enter : 2  Railing:  Yes

## 2020-03-18 NOTE — PROGRESS NOTES
[facility]  Progress Note     Michelle Burnette Patient Status:  Observation    1933 MRN [id number]   [facility] 3SW-A Attending Bridgette Martinez MD   Hosp Day # 0 PCP Earl Burton MD     Chief Complaint: back pain    S: Sheryl Suarez after and see how she does  -MRI thoracic P  -Pain control  -PT  -NS on consult  -eval for vertebroplasty. May not be able to be done as elective procedures are being held currently due to [place] pandemic  2. HTN-continue home meds controlled  3.rt 3rd rib f

## 2020-03-18 NOTE — PROGRESS NOTES
Pain Service  79 yo admitted with intractable back pain.  Multiple acute and subacute vertebral fractures T12, L1, L2, L3.       PROCEDURE:  MRI SPINE LUMBAR (CPT=72148)     COMPARISON:  MR [facility], MRI SPINE LUMBAR (CPT=72148), 2/06/2019, 11:54 AM.  GAIL again demonstrated. There is stable moderate central canal and   subarticular zone stenosis at L4-5 secondary to facet hypertrophy and ligamentum flavum thickening. CONCLUSION:    1.  Acute to subacute compression fractures involving T12, L1, L2 and

## 2020-03-18 NOTE — PROGRESS NOTES
MRI reviewed this evening. Report as follows:    \"Acute to subacute compression fractures involving T12, L1, L2 and L3 vertebral bodies. None of these were present on 2/6/2019. There is 5 mm retropulsion of L3 with mild spinal stenosis.   No retropulsio

## 2020-03-18 NOTE — PLAN OF CARE
Admitted via cart. Speaks small amount of English. (speaks Shawn Erps)   pad needed  Spoke w/ son re: admission questions.   MRI spine ordered- check list done in ER- scheduled between 8 & 9

## 2020-03-18 NOTE — PROGRESS NOTES
[facility]  Neurosurgery Progress Note    Jerod Bennett Patient Status:  Observation    1933 MRN [id number]   [facility] [place] [place] 3SW-A Attending Luiza Priest MD   Hosp Day # 0 PCP Shea Gustafson MD     Chief Complaint:  Lumbar ba entire vertebral body with approximately 5 mm retropulsion and mild spinal stenosis with the thecal sac measuring 8 mm AP in the midline.   There is approximately 40% loss of vertebral body height anteriorly and 60% loss   of vertebral body height in the ce

## 2020-03-19 NOTE — PLAN OF CARE
Pt A&O. Speaks Gujarati and speaks little to no [place].  needed. On room air. SCDs to BLE and ankle pumps encouraged. Pt tolerating general vegetarian diet. Last BM 3/16/20. Incontinent, brief in place. Pain controlled with PO medications.  On be

## 2020-03-19 NOTE — PHYSICAL THERAPY NOTE
PHYSICAL THERAPY EVALUATION - INPATIENT     Room Number: 367/367-A  Evaluation Date: 3/19/2020  Type of Evaluation: Re-evaluation  Physician Order: PT Eval and Treat    Presenting Problem: lumbar compression fractures  Reason for Therapy: Mobility Dy otherwise maintained in the thoracic spine. There is associated mild retropulsion of bone and disc bulge at T11-T12 that is causing moderate spinal canal stenosis.      Problem List  Principal Problem:    Closed fracture of one rib of left side, initial en not need to be worn in the bed, in a chair, or ambulating < 20 feet from those positions. The pt to wear when OOB Per Dr. John Lawson note.      Fall Risk: High fall risk    WEIGHT BEARING RESTRICTION  Weight Bearing Restriction: None                PA Score (AM-PAC Scale): 43.63   CMS Modifier (G-Code): CK    FUNCTIONAL ABILITY STATUS  Gait Assessment   Gait Assistance: Minimum assistance  Distance (ft): 5  Assistive Device: Rolling walker  Pattern: Shuffle  Stoop/Curb Assistance: Not tested  Comment : Inpatient Basic Mobility Short Form was completed and this patient is demonstrating a 46.58% degree of impairment in mobility. Research supports that patients with this level of impairment may benefit from DC to [facility]   Based on this evaluation, patient's

## 2020-03-19 NOTE — PLAN OF CARE
TLSO delivered and fitted to patient this morning. Seen and evaluated by PT/OT. Plan to DC to [facility] when cleared for DC. Repeat Hgb 8.1, pt reports no bleeding at home prior to admission,. Dr Tyrese Amor updated.    Pain controlled with PO norco.       Ok

## 2020-03-19 NOTE — CM/SW NOTE
PT/OT continuing to recommend [facility]. Spoke with pt's son, Zuri Ocampo who confirmed plan for [facility]. Updates sent to Loraine Lozano via [place]. Await medical clearance for DC.   / to remain available for support and/or discharge pl

## 2020-03-19 NOTE — PROGRESS NOTES
Patient picked up by [facility] ambulance for transfer. All belongings sent with patient at this time. Patient family updated to discharge plan.  Ipad used to discuss with patient. All questions answered at this time.    Attempting to call report to [facility]

## 2020-03-19 NOTE — PROGRESS NOTES
Attempted to call report to Aleksandra Republic. Left message for RN to call back. Will attempt again at 1700.

## 2020-03-19 NOTE — PROGRESS NOTES
[facility]  Neurosurgery Progress Note    Edilberto Figueroa Patient Status:  Observation    1933 MRN [id number]   [facility] 3SW-A Attending Adrian Reyes MD   Hosp Day # 0 PCP Melissa Guerra MD     Chief Complaint:  Lumbar ba CONCLUSION:       1. Acute T12 compression fracture is again identified. Vertebral body heights are otherwise maintained in the thoracic spine.   There is associated mild retropulsion of bone and disc bulge at T11-T12 that is causing moderate spinal c

## 2020-03-19 NOTE — OCCUPATIONAL THERAPY NOTE
OCCUPATIONAL THERAPY EVALUATION - INPATIENT     Room Number: 367/367-A  Evaluation Date: 3/19/2020  Type of Evaluation: Initial  Presenting Problem: (compression fxs T11, L2, L3 & L 3rd rib fx)    Physician Order: IP Consult to Occupational Therapy  Reason SITUATION  Type of Home: House  Home Layout: Two level  Lives With: Spouse;Caregiver part-time; Family; Son                     Drives: No  Patient Regularly Uses: None    Prior Level of Function:   Per PT initial eval:    Prior to one month ago the patient Putting on and taking off regular lower body clothing?: A Lot  -   Bathing (including washing, rinsing, drying)?: A Lot  -   Toileting, which includes using toilet, bedpan or urinal? : A Lot  -   Putting on and taking off regular upper body clothing?: Víctor Folk to participate in ADLS, instrumental activities of daily living, rest and sleep, work, leisure and social participation.      The patient is functioning below her previous functional level and would benefit from skilled inpatient OT to address the above def min assist

## 2020-03-20 NOTE — DISCHARGE SUMMARY
[facility]  DISCHARGE SUMMARY     Schuyler Paula Patient Status:  Observation    1933 MRN [id number]   [facility] 5NW-A Attending No att. providers found   Hosp Day # 0 PCP Keo Tay MD     Date of Admission: 3/17/20 eval for kyphoplasty. Procedures during hospitalization:   • none    Incidental or significant findings and recommendations (brief descriptions):  • Anemia. Mild drop in hgb compared to baseline. hgb was stable. No signs of bleeding. Increased PO iron. mouth 3 (three) times daily as needed. Quantity:  20 tablet  Refills:  0     Naloxone HCl 4 MG/0.1ML Liqd      4 mg by Nasal route as needed. If patient remains unresponsive, repeat dose in other nostril 2-5 minutes after first dose.    Quantity:  1 kit MD Cristian 3/19/2020    Time spent:  > 30 minutes

## 2020-03-20 NOTE — CM/SW NOTE
03/19/20 1500   Discharge disposition   Expected discharge disposition Skilled Nurs   Name of Berny Malin   Discharge transportation [facility] 3/19/2020

## 2020-05-05 PROBLEM — U07.1 COVID-19 VIRUS INFECTION: Status: ACTIVE | Noted: 2020-01-01

## 2020-05-05 PROBLEM — R73.9 HYPERGLYCEMIA: Status: ACTIVE | Noted: 2020-01-01

## 2020-05-05 PROBLEM — E87.2 METABOLIC ACIDOSIS: Status: ACTIVE | Noted: 2020-01-01

## 2020-05-05 PROBLEM — E86.0 DEHYDRATION: Status: ACTIVE | Noted: 2020-01-01

## 2020-05-05 PROBLEM — E87.1 HYPONATREMIA: Status: ACTIVE | Noted: 2020-01-01

## 2020-05-05 PROBLEM — D64.9 ANEMIA, UNSPECIFIED TYPE: Status: ACTIVE | Noted: 2020-01-01

## 2020-05-05 NOTE — TELEPHONE ENCOUNTER
Spoke with patient's son per HIPAA, and confirmed identity (verified name and ). Called to notify patient that the COVID 19 test results are POSITIVE.  Patient directed to isolate away form any household members as much as possible and the general public

## 2020-05-05 NOTE — ED NOTES
Pt sitting upright on cart with son at bedside. Medicated for C/O headache. Denies new complaints. Awaiting ready bed for admission.

## 2020-05-05 NOTE — H&P
[facility]  History and Physical     Kary Flores Patient Status:  Emergency    1933 MRN [id number]   Location [facility] [facility] Attending Krishna Hernandez MD   Hosp Day # 0 PCP Bashir Hodges MD     Chief Complain mouth daily. , Disp: , Rfl:   Levothyroxine Sodium 75 MCG Oral Tab, Take 75 mcg by mouth before breakfast., Disp: , Rfl:   acetaminophen 500 MG Oral Tab, Take 500 mg by mouth every 6 (six) hours as needed for Pain., Disp: , Rfl:         Review of Systems: Quality:  · DVT Prophylaxis: Lovenox  · CODE status: Full  · Welch: No    Plan of care discussed with patient and family at bedside.      Zeus Velasco DO  5/5/2020

## 2020-05-05 NOTE — ED PROVIDER NOTES
Patient Seen in: [facility] Emergency Department      History   Patient presents with:  Cough/URI  Nausea/Vomiting/Diarrhea  Dyspnea MARA SOB    Stated Complaint: +COVID ~ headache, fatgiue and no appetite or drinking yesterday, + cough     HPI    Pa eJVD  Lungs: Clear to auscultation bilaterally. No rhonchi or rales. Heart: Normal S1 and S2, without murmur or rub. Distal pulses are strong and symmetric. Abdomen: Soft, nondistended.   Minimally diffusely tender definitely without guarding, rigidity, ---------                               -----------         ------                     CBC W/ DIFFERENTIAL[[id number]]          Abnormal            Final result                 Please view results for these tests on the individual orders.    URINALYSIS WIT diagnosis)  Dehydration  Hyponatremia  Anemia, unspecified type    Disposition:  Admit  5/5/2020  4:51 pm    Follow-up:  No follow-up provider specified.         Medications Prescribed:  Current Discharge Medication List                       Present on Adm

## 2020-05-06 NOTE — PLAN OF CARE
Patient is alert and oriented x4, vital signs stable, +covid, speaks [place] Adamaris. Ipad  for assistance. Upper and lower dentures. Low grade temp earlier today. 1L on .  Pt desaturated to 87% while being changed, increased to 2-3L for position michael nutrition restrictions as appropriate  Outcome: Progressing     Problem: Impaired Functional Mobility  Goal: Achieve highest/safest level of mobility/gait  Description  Interventions:  - Assess patient's functional ability and stability  - Promote increasi

## 2020-05-06 NOTE — CM/SW NOTE
Call to patient son Tarry Buerger [phone] w/ granddaughter Yuki Ramon on the line. Patient and spouse (also hospitalized) live with son and family. Patient receives assist from family w/ medication management, dressing/showering since recent back fracture.  They pr

## 2020-05-06 NOTE — PLAN OF CARE
NURSING ADMISSION NOTE      Patient admitted via Cart  Oriented to room. Safety precautions initiated. Bed in low position. Call light in reach. Received patient from ER with chief complaints of cough for a week, + COVID.  AOx4 according to son lonnie

## 2020-05-06 NOTE — PROGRESS NOTES
[facility]  Progress Note     Deella Mount Royal Patient Status:  Inpatient    1933 MRN [id number]   [facility] 3SW-A Attending Serge Mccarthy MD   Hosp Day # 1 PCP Umair Nguyen MD     Chief Complaint: cough    S: Patient w mEq Oral Q4H   • Levothyroxine Sodium  75 mcg Oral Daily   • Hydroxychloroquine Sulfate  200 mg Oral BID   • enoxaparin  40 mg Subcutaneous Daily       ASSESSMENT / PLAN:     1. Viral pneumonia secondary to COVID19  1. HCQ  2.  Monitor inflammatory markers

## 2020-05-07 NOTE — CONSULTS
[facility]  Report of Consultation    Chucky Melissa Patient Status:  Inpatient    1933 MRN [id number]   [facility] 3SW-A Attending Venecia Beverly MD   Hosp Day # 2 PCP Viktoriya Salas MD     Reason for Consultation:  Progr • Thyroid disease      No family history on file. reports that she has never smoked.  She has never used smokeless tobacco.  Living with her  currently as above    Allergies:  No Known Allergies    Medications:  traMADol HCl 50 MG Oral Tab, Take (37.2 °C) Oral 84 (!) 30 96 %   05/06/20 2008 134/73 99 °F (37.2 °C) Oral 80 24 94 %   05/06/20 1738 117/57 99.6 °F (37.6 °C) Oral 88 — 94 %         Physical Exam:   General: alert, cooperative, has been able to feed herself throughout the day no respirato increased bilateral lower lobe airspace disease right greater than left, no obvious effusion    Assessment and Plan:  Patient Active Problem List:     Sacral insufficiency fracture     Sacral insufficiency fracture, sequela     Impaired ambulation     Clos

## 2020-05-07 NOTE — PROGRESS NOTES
Patient has become more hypoxic this afternoon, more tachypnic. Will check inflammatory markers, ABG and CXR. Will give ACTEMRA   Transfer to ICU. Family was updated earlier, will call them now.        Addendum:  Spoke to family over the phone and

## 2020-05-07 NOTE — PHYSICAL THERAPY NOTE
PHYSICAL THERAPY EVALUATION - INPATIENT     Room Number: 363/363-A  Evaluation Date: 5/7/2020  Type of Evaluation: Initial  Physician Order: PT Eval and Treat    Presenting Problem: +COVID19  Reason for Therapy: Mobility Dysfunction and Discharge Aleah STRENGTH ASSESSMENT  See OT note for UE assessment    Lower extremity ROM is within functional limits     Lower extremity strength is within functional limits     BALANCE  Static Sitting: Good  Dynamic Sitting: Good  Static Standing: Fair -  Dynamic Standi met;Call light within reach;RN aware of session/findings; All patient questions and concerns addressed; Alarm set    ASSESSMENT   Patient is a 80year old female admitted on 5/5/2020 for +COVID19.   Pertinent comorbidities and personal factors impacting thera

## 2020-05-07 NOTE — PLAN OF CARE
Received pt from PMU. Language barrier present. Pt placed on 10L HFNC, tachypneic. All other vitals stable. Purewick placed. Spoke with Dr. Kathleen Marinelli and family was updated. Will continue to monitor.

## 2020-05-07 NOTE — PROGRESS NOTES
Pt noted to be more hypoxic while RN at bedside - desaturated to 82% on 2L NC. Placed on 6L hi-flow; O2 sustained 83%. RR 34. Placed on NRBM, titrated up to 10L - O2 sat 94%. Gloria Varela at bedside.  Using iPad  to communicate with pt - she report

## 2020-05-07 NOTE — PLAN OF CARE
PROBLEM: +COVID    ASSESSMENT: The pt is A&Ox4. Primarily Gujarati-speaking, using iPad  to communicate. Maintaining O2 sat WNL on 2L NC - desaturated to 88% on 1L with position changes/lying down. IS - reached 250. Denies SOB.  Non-productive cou consult to assist with strengthening/mobility  - Encourage toileting schedule  Outcome: Progressing

## 2020-05-07 NOTE — PLAN OF CARE
PROBLEM: + COVID     ASSESSMENT: PT is A&OX4, Roshan Polanco speaking only, interpretor in room. VSS, afebrile. Maintaining O2 sats >94% on 1L NC, requires 3L with ambulation or laying flat to be changed, RA is baseline. . Tele, NSR. Lovenox.  Incontinent and ability and stability  - Promote increasing activity/tolerance for mobility and gait  - Educate and engage patient/family in tolerated activity level and precautions    Outcome: Progressing     Problem: SAFETY ADULT - FALL  Goal: Free from fall injury  Cristi

## 2020-05-07 NOTE — CM/SW NOTE
KELLI contacted pt's son, Gianni Sellers, ph: [phone] requesting an update on [place] [address] agency name. He states that he has not called the RN yet who comes to the home but he will as soon as possible.  KELLI requested he contact the RN with the name of the agency f

## 2020-05-07 NOTE — PROGRESS NOTES
[facility]  Progress Note     Marcial Martina Patient Status:  Inpatient    1933 MRN [id number]   [facility] 3SW-A Attending Devonte Jj MD   Hosp Day # 2 PCP Cinthia Mason MD     Chief Complaint: cough    S: Patient w 0.3   TP 7.5  --   --  6.0*       Estimated Creatinine Clearance: 56.9 mL/min (A) (based on SCr of 0.5 mg/dL (L)). No results for input(s): PTP, INR in the last 168 hours.          COVID-19 Lab Results    COVID-19  Lab Results   Component Value Date

## 2020-05-08 NOTE — OCCUPATIONAL THERAPY NOTE
OCCUPATIONAL THERAPY EVALUATION - INPATIENT     Room Number: 363/363-A  Evaluation Date: 5/7/2020  Type of Evaluation: Initial  Presenting Problem: COVID-19 PNA, hyponatremia, anemia, dehydration     Physician Order: IP Consult to Occupational Therapy  Ebbie Stephan is none identified      OBJECTIVE  Precautions: TLSO;Spine;  needed;Bed/chair alarm(TLSO if walking more than 25ft, speaks [facility] )  Fall Risk: High fall risk    WEIGHT BEARING RESTRICTION  Weight Bearing Restriction: None                PAIN A to keep mask on.  iPad  in [place] utilized. Pt was found in bed in a semi-supine position. Pt performed supine>sit EOB with min assistance via log rolling.  Pt performed sit>stand with RW and min assistance with min verbal/visual/tactile cues f of tasks    Clinical Decision Making MODERATE - Analysis of occupational profile, detailed assessments, several treatment options    Overall Complexity MODERATE     OT Discharge Recommendations: 24 hour care/supervision;Home with home health PT/OT  OT [facility]

## 2020-05-08 NOTE — PLAN OF CARE
Assume care in am. Patient awake but easily fatigued. Language barrier but understand simple english and follows commands. Vitals stable. Poor appetite, needs encouragement to eat. Fair diet during lunch, likes fruits and ensure. I and O monitored.  Srikanth Espino

## 2020-05-08 NOTE — PLAN OF CARE
Received AO x 3-4; Thersa Rachel; grand daughter over the phone interpreted for the pt. On 15L HFNC, desat`ng to 85-88%; RR 30`s; ABG done, low PO2 50 mmHg; Dr. Marcy Lynne notified.  Switched to  Vapotherm 25L/ 100%, sat 90`s, RR in 20`s  Complained of dry c

## 2020-05-08 NOTE — PROGRESS NOTES
[facility] Pulmonary/Critical Care Progress Note     SUBJECTIVE/Interval history: All events, procedures, notes reviewed. Transferred to ICU overnight due to worsening hypoxia and work of breathing.  Transitioned to vapo 05/07/20  0635 05/08/20  0513   GLU 87  --  92 86   BUN 10  --  8 6*   CREATSERUM 0.47*  --  0.50* 0.53*   GFRAA 103  --  101 99   GFRNAA 89  --  87 86   CA 7.6*  --  7.9* 8.3*   *  --  125* 123*   K 3.6 4.3 4.1 4.1   CL 98  --  95* 91*   CO2 20.0* Erica Res, RBCUR, BACUR, EPIUR in the last 168 hours.     Interval Radiology:         • iron sucrose  200 mg Intravenous Daily   • amLODIPine Besylate  5 mg Oral Daily   • pantoprazole (PROTONIX) IV push  40 mg Intravenous Daily   • Levothyroxine Sodium

## 2020-05-08 NOTE — RESPIRATORY THERAPY NOTE
Patient received on Vapotherm 25L/100% and was reportedly desaturating earlier while on HFNC 10 LPM. Saturation remains 94%, FiO2 down to 90%. Will continue to wean cautiously.

## 2020-05-08 NOTE — PROGRESS NOTES
[facility]  Progress Note     Gaykely Pekrupa Patient Status:  Inpatient    1933 MRN [id number]   [facility] 4SW-A Attending Marly Pina MD   Hosp Day # 3 PCP Shea Gustafson MD     Chief Complaint: cough  S: Patient on 24.0 25.0   ALKPHO 101  --   --  84 94   AST 42*  --   --  41* 48*   ALT 16  --   --  12* 12*   BILT 0.5  --   --  0.3 0.3   TP 7.5  --   --  6.0* 6.6       Estimated Creatinine Clearance: 53.7 mL/min (A) (based on SCr of 0.53 mg/dL (L)).     Recent Labs discharge: tbd  Discharge is dependent on: course  At this point Ms. Alivia Lofton is expected to be discharge to: TBD  Plan of care discussed with patient and RN.    Updated patient's son and grand-daughter over the phone and discussed [address]  NII Candelaria

## 2020-05-08 NOTE — OCCUPATIONAL THERAPY NOTE
Pt transferred to ICU on 5/7 due to increased O2 requirements. Pt is currently on 25L HF O2. Will follow-up for therapy once pt is on 6L O2 or less and is able to tolerate activity/mobility.      Thank you for allowing me to care for this patient,   Peterson Valdez

## 2020-05-09 NOTE — PLAN OF CARE
Received pt alert oriented to person, place, situation. Knows it's Saturday but unsure of year. Per granddaughter, the pt goes by the lunar calendar. Speaking with granddaughter over the phone and appropriate. Continues on vapotherm. Afebrile.  NSR on m

## 2020-05-09 NOTE — PROGRESS NOTES
[facility]  Progress Note    Jerod Bennett Patient Status:  Inpatient    1933 MRN [id number]   [facility] 4SW-A Attending Marly Pina MD   Hosp Day # 4 PCP Shea Gustafson MD     Subjective:  Jerod Bennett is a(n) 80 ye 125* 123* 121*   K 4.1 4.1 3.9   CL 95* 91* 89*   CO2 24.0 25.0 26.0   ALKPHO 84 94 93   AST 41* 48* 56*   ALT 12* 12* 14   BILT 0.3 0.3 0.5   TP 6.0* 6.6 6.3*       No results for input(s): MG in the last 168 hours.     No results found for: PHOS, PHOSPHOR abdomen/pelvis  · Check urine sodium and osm. Dose lasix. Discussed w Dr. Madeleine Thapa  · Mobilize/OOBTC as able  · Ppx: SCDs, LMWH BID, PPI  · Dispo: ICU     Critical care time 45 minutes.   Discussed w Dr. Jorge Luis Goodwin, Dr. Madeleine Thapa, RN, APN and RT    Manju Villagomez

## 2020-05-09 NOTE — CONSULTS
[facility]  Report of Consultation    Julia Juarez Patient Status:  Inpatient    1933 MRN [id number]   [facility] 4SW-A Attending Jourdan Beck MD   Hosp Day # 4 PCP Jossy Geronimo MD     Reason for Consultation:  Hyponat tab 5 mg, 5 mg, Oral, Daily  •  Pantoprazole Sodium (PROTONIX) 40 mg in Sodium Chloride 0.9 % 10 mL IV push, 40 mg, Intravenous, Daily  •  Levothyroxine Sodium tab 75 mcg, 75 mcg, Oral, Daily  •  sodium chloride 0.9% IV bolus 500 mL, 500 mL, Intravenous, P Imaging:  Reviwed    Impression:  1. Acute respiratory failure due to COVID 19 pneumonia  - on high flow O2; s/p tociluzumab + plaquenil. - pulm following  2.  Hyponatremia - acute ; she is somewhat hypervolemic based on peripheral extremities exam

## 2020-05-09 NOTE — PROGRESS NOTES
[facility]  Progress Note     Lalito Bottom Patient Status:  Inpatient    1933 MRN [id number]   [facility] 4SW-A Attending Teresa Rivera MD   Hosp Day # 4 PCP Donovan Dawson MD     Chief Complaint: hypoxia     S: Patien Labs   Lab 05/08/20  0513   PTP 13.5   INR 1.00            COVID-19 Lab Results    COVID-19  Lab Results   Component Value Date    COVID19 Detected (AA) 05/04/2020       Pro-Calcitonin  Recent Labs   Lab 05/05/20  1523 05/07/20 2058   PCT <0.05 <0.05

## 2020-05-09 NOTE — PLAN OF CARE
Received AO x 4, facetimed with her grand dtr, appropriate during the conversation. On 25L/ 100%, desat`ng to 88%; increased to 30L. Plan of ABG at 12MN was deferred since pt  looked comfy. + non prod cough. SR on the monitor. BP stable. Afebrile.   Krishna Romp or any respiratory difficulty  - Respiratory Therapy support as indicated  - Manage/alleviate anxiety  - Monitor for signs/symptoms of CO2 retention  Outcome: Not Progressing

## 2020-05-09 NOTE — RESPIRATORY THERAPY NOTE
Received patient on vapotherm 25L 100%, sats were between 87-91%. APN notified, flow increased to 30L. Throughout the night patient sats have been mid to upper 90s, occasionally 91-92% but goes right back up. Patient RR 20-30s. Will continue to monitor.

## 2020-05-10 NOTE — PROGRESS NOTES
[facility]  Progress Note     Arely Upton Patient Status:  Inpatient    1933 MRN [id number]   [facility] 4SW-A Attending Mark Malone MD   Hosp Day # 5 PCP Ozie Severance, MD     Chief Complaint: hypoxia    S: Patient ALT 12* 14  --  19   BILT 0.3 0.5  --  0.6   TP 6.6 6.3*  --  7.2       Estimated Creatinine Clearance: 43.8 mL/min (based on SCr of 0.65 mg/dL).     Recent Labs   Lab 05/08/20  0513   PTP 13.5   INR 1.00       Recent Labs   Lab 05/05/20  1523 05/07/20  1

## 2020-05-10 NOTE — PLAN OF CARE
Received pt drowsy; oriented to person, place, situation. Knows it's Sunday but unsure of year. Appropriate while speaking with granddaughter one the phone. Continues on vapotherm. Afebrile. NSR on monitor. Tolerating diet per order, appetite fair.  No BM

## 2020-05-10 NOTE — PROGRESS NOTES
[facility]  Progress Note    Mary Solum Patient Status:  Inpatient    1933 MRN [id number]   [facility] 4SW-A Attending Ekaterina Peres MD   Hosp Day # 5 PCP Rocio Mantilla MD     Feels better today  Asked for food  Denies erythema. Psychiatric: Appropriate mood and affect.   Recent Labs     05/08/20  0513 05/09/20  0415 05/10/20  0435   WBC 2.4* 4.7 6.7   HGB 10.4* 10.2* 11.4*   MCV 61.9* 61.7* 61.3*   .0 479.0* 541.0*   INR 1.00  --   --        Recent Labs     05/08

## 2020-05-10 NOTE — PLAN OF CARE
Received pt alert, oriented to her name, place, follows all simple commands, complains of pain to back, grimacing, asking for food, said she is hungry. Pt taken for ct of abdomen/pelvis without contrast, tolerated well.  After ct medicated with narco one pi

## 2020-05-10 NOTE — PROGRESS NOTES
[facility]  Progress Note    Calvin Dust Patient Status:  Inpatient    1933 MRN [id number]   [facility] 4SW-A Attending Irene Galan MD   Hosp Day # 5 PCP Veena Washburn MD     Subjective:  Calvin Dust is a(n) 80 ye 123*   K 4.1 3.9 3.9 3.9   CL 91* 89* 86* 91*   CO2 25.0 26.0 26.0 24.0   ALKPHO 94 93  --  113   AST 48* 56*  --  61*   ALT 12* 14  --  19   BILT 0.3 0.5  --  0.6   TP 6.6 6.3*  --  7.2       No results for input(s): MG in the last 168 hours.     No result process 5/9  · Monitor sodium levels. Dropped to 119, now up to 123 this am.  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 · Mobilize/OOBTC as able  · Ppx: SCDs, LMWH BID, PPI  · Dispo: ICU     Critical care time 45 minutes.   Dis

## 2020-05-11 NOTE — OCCUPATIONAL THERAPY NOTE
Attempted to see pt this AM for OT session, pt with increasing O2 needs and still with O2 stats in 80s while on max vapotherm per chart review.   Plan to follow peripherally, monitoring documentation and speaking with nursing regarding patients O2 needs and

## 2020-05-11 NOTE — PROGRESS NOTES
Intermittent Afib up to 160s. Confirmed on EKG. Cardizem protocol, draw Troponin. No complaint of chest pain. Oxygen and saturations stable. Dr. Delmy Lawrence updated.     Marylen High APRN  Critical Care Nurse Practitioner  [address]

## 2020-05-11 NOTE — PROGRESS NOTES
[facility]    Patients Name: Lalito Agarwal  Attending Physician: Teresa Rivera MD  CSN: [id number]   Location:  470/470-A  MRN: [id number]    YOB: 1933  Admission Date: 5/5/2020     Intubation    Called to Intubate Patient.   Patient

## 2020-05-11 NOTE — PLAN OF CARE
Pt intubated around 0945. Precedex and fentanyl given for comfort/sedation purposes. Hypotensive following intubation. Levophed started. Welch and OG placed. Granddaughter updated on pt's status.

## 2020-05-11 NOTE — PLAN OF CARE
Received pt this AM. Per , pt oriented to self and place. Pt intubated at 0930 (please see other note). Pt sedated on precedex/fent. At times does wake up and appears confused; pt reassured. Arterial and PICC line placed.  Pt hypotensive-levophed

## 2020-05-11 NOTE — PROGRESS NOTES
[facility]  Progress Note    Sj Sun Patient Status:  Inpatient    1933 MRN [id number]   [facility] 4SW-A Attending Chaitanya Jenkins MD   Hosp Day # 6 PCP Daniela Krishnamurthy MD     Subjective:  Sj Sun is a(n) 80 ye > 0.65 0.86 0.71   GFRAA 103   < > 92 70 89   GFRNAA 90   < > 80 61 77   CA 8.5   < > 9.1 8.8 8.8   ALB 2.4*  --  2.8*  --  2.8*   *   < > 123* 131* 131*   K 3.9   < > 3.9 3.7 3.6   CL 89*   < > 91* 96* 96*   CO2 26.0   < > 24.0 27.0 27.0   ALKPHO 93 hydroxychloroquine; given tocilizumab 5/7  · Continue to follow serial inflammatory markers, CRP better but ferritin continues to increase  · Continue LMWH at \"intermediate dosing\" LMWH 0.5mg/kg BID.   LE doppler negative for DVT 5/8  · Check daily EKG fo

## 2020-05-11 NOTE — PLAN OF CARE
Alert and withdrawn, keeps arms crossed at almost all times. Severe crackles to posterior lower fields as well as anterior fields, when asked states does feel dyspneic.   Fifth pulse ox has given only occasionally reliable O2 sat waveform (in report this h

## 2020-05-11 NOTE — PROGRESS NOTES
[facility]  Nephrology Progress Note    Zeke Ayala Patient Status:  Inpatient    1933 MRN [id number]   [facility] 4SW-A Attending Dixon Laws MD   Hosp Day # 6 PCP Georgette Sheth MD       SUBJECTIVE:  More hypoxic thi 93 83 107* 87       Recent Labs   Lab 05/07/20  0635 05/07/20  1705 05/08/20  0513 05/09/20  0415 05/10/20  0435 05/11/20  0454   ALT 12*  --  12* 14 19 29   AST 41*  --  48* 56* 61* 90*   ALB 2.5*  --  2.5* 2.4* 2.8* 2.8*   * 532* 493* 504* 614* 57 dysfunction. Unclear to what degree volume excess is contributing to hyoxia.   IV lasix    Cc time 30 minutes      Loida Jerry  5/11/2020  8:32 AM

## 2020-05-11 NOTE — PROGRESS NOTES
[facility]  Progress Note     Marcia Salamanca Patient Status:  Inpatient    1933 MRN [id number]   [facility] 4SW-A Attending Alyssa Esteban MD   Hosp Day # 6 PCP Leonila Nj MD     Chief Complaint: hypoxia, drowsy    S: < > 24.0 27.0 27.0   ALKPHO 93  --  113  --  117   AST 56*  --  61*  --  90*   ALT 14  --  19  --  29   BILT 0.5  --  0.6  --  0.6   TP 6.3*  --  7.2  --  7.0    < > = values in this interval not displayed.        Estimated Creatinine Clearance: 40.1 mL/mi markers  4. Tylenol PRN  5. Antitussive   6. S/p Actemra 5/7  7. Therapeutic proning  4. abd pain  1. CT A/P neg   5. Hyponatremia- improved  1. Lasix IV PRN  2. S/p tolvaptan   3. Renal rec appreciated   6. Essential HTN  7. Hypothyroidism-Synthroid   8.

## 2020-05-11 NOTE — PROGRESS NOTES
Received pt on vapotherm 30L 100%. Pt intubated this morning with 7.5 ETT 21@ lip. Pt's current vent settings: VC+ 20/360/60%+5 tolerating well. Continue to wean fio2 as able. Will continue to monitor.      Most Recent ABG (if any available):    Recent Labs

## 2020-05-11 NOTE — PROGRESS NOTES
Arterial Line  Performed by: MIRYAM Edward  Authorized by: MIRYAM Edward     General Information and Staff     Procedure Start: 2831  Patient Location:  ICU  Indication: continuous blood pressure monitoring and blood sampling needed    Site Identific

## 2020-05-12 NOTE — RESPIRATORY THERAPY NOTE
Pt remain stable on vent setting of RR 20  FiO2 45% Peep 5. Breath sounds are diminish bilaterally with very few secretions.

## 2020-05-12 NOTE — PLAN OF CARE
Problem: METABOLIC/FLUID AND ELECTROLYTES - ADULT  Goal: Electrolytes maintained within normal limits  Description  INTERVENTIONS:  - Monitor labs and rhythm and assess patient for signs and symptoms of electrolyte imbalances  - Administer electrolyte re Assess for changes in mentation and behavior  - Position to facilitate oxygenation and minimize respiratory effort  - Oxygen supplementation based on oxygen saturation or ABGs  - Provide Smoking Cessation handout, if applicable  - Encourage broncho-pulmona function  Description  INTERVENTIONS:  - Assess bowel function  - Maintain adequate hydration with IV or PO as ordered and tolerated  - Evaluate effectiveness of GI medications  - Encourage mobilization and activity  - Obtain nutritional consult as needed

## 2020-05-12 NOTE — OCCUPATIONAL THERAPY NOTE
Following for OT treatment, pt intubated yesterday and on full vent support, plan to follow peripherally, monitoring documentation and speaking with nursing regarding patients O2 needs and appropriateness for therapy.

## 2020-05-12 NOTE — PLAN OF CARE
Received patient orally intubated and sedated on precedex and fentanyl. rass goal -2. Decreasing sedation throughout the day per pulm cc. Pt will open eyes, aviles not following commands. Tube feedings started via og. Minimal urine output, esposito in place.  Lar

## 2020-05-12 NOTE — PROGRESS NOTES
[facility]  Progress Note     Leigha Bernal Patient Status:  Inpatient    1933 MRN [id number]   [facility] 4SW-A Attending Mony Sultana MD   Hosp Day # 7 PCP Korin Hagen MD     Chief Complaint: intubated  S: Patient Detected (AA) 05/04/2020       Pro-Calcitonin  Recent Labs   Lab 05/05/20  1523 05/07/20 2058   PCT <0.05 <0.05       Cardiac  Recent Labs   Lab 05/05/20  1523 05/07/20  1705 05/11/20  0454   TROP <0.045 <0.045 <0.045   PBNP 411  --   --        Creatinine HTN  7. Hypothyroidism-Synthroid   8.  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

## 2020-05-12 NOTE — DIETARY NOTE
[facility]    NUTRITION INITIAL ASSESSMENT    Pt does not meet malnutrition criteria. NUTRITION DIAGNOSIS/PROBLEM:  Inadequate oral intake related to physiological causes as evidenced by intubated/sedated, NPO status.     NUTRITION INTERVENTION: Maintain lean body mass  5. Tolerance of enteral nutrition without signs/symptoms of intolerance (abdominal discomfort/distention)  6.  Alternative means of nutrition at goal to meet 100% patient nutrition prescription    MEDICATIONS:  Decadron, precedex, c

## 2020-05-12 NOTE — PROGRESS NOTES
Assumed care of patient at 2300. Received on full vent support. Tolerated well overnight with no events. Weaned fio2 to 40% having to increase back to 45% after desaturation episode. Will continue to monitor.        05/12/20 6913   Vent Information   $ RT S

## 2020-05-12 NOTE — PLAN OF CARE
Sedated to RASS -2, with mild stimulation she wakes up with a panicked look on face, responds well to soothing and drifts back to sleep/sedated state. Crackles to posterior lower fields, #7.5/21 ACVC+ 20 360 0.4 +5, pap 24, plateau 21, no secretions.  Acti

## 2020-05-12 NOTE — PHYSICAL THERAPY NOTE
Pt being followed by physical therapy. Pt intubated 5/11/20 and remains intubated/sedated. Due to change in status, will place patient ON HOLD. Please re-order therapy when appropriate.

## 2020-05-12 NOTE — PROGRESS NOTES
[facility]  Progress Note    Lashawnsly Romero Patient Status:  Inpatient    1933 MRN [id number]   [facility] 4SW-A Attending Agustina Steinberg MD   Hosp Day # 7 PCP Mary Ann Davis MD     Subjective:  Ramesh Romero is a(n) 80 ye 05/12/20  0425   WBC 6.7 8.4 14.7*   HGB 11.4* 11.7* 11.2*   HCT 34.8* 36.4 34.3*   .0* 569.0* 674.0*       Recent Labs   Lab 05/10/20  0435 05/10/20  1623 05/11/20  0454 05/11/20  1806 05/12/20  0425   GLU 83 107* 87  --  125*   BUN 11 14 16  --  3 05/11/20  0454   TROP <0.045 <0.045 <0.045   PBNP 411  --   --          Invalid input(s): CKTOTAL, TROPONINI, TROPONINT, CKMBINDEX    Cultures:  NGTD    Radiology:  5/12 CXR reviewed personally and compared to previous which shows slight worsening in bilat up to 131 this am.  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   · Start tube feeds and advance as tolerated; nutrition evaluation  · Mobilize/OOBTC as able  · Ppx: SCDs, LMWH BID, PPI  · Dispo: ICU      MD Sera Murillo [facility]

## 2020-05-12 NOTE — PROGRESS NOTES
[facility]  Progress Note    Marcia Salamanca Patient Status:  Inpatient    1933 MRN [id number]   [facility] 4SW-A Attending Alyssa Esteban MD   Hosp Day # 7 PCP Leonila Nj MD     Yesterday's events reviewed  Patient sarah Daily  Acetaminophen-Codeine #3 (TYLENOL #3) 300-30 MG tab 1 tablet, 1 tablet, Oral, Q4H PRN  dextromethorphan-guaiFENesin (ROBITUSSIN-DM)  MG/5ML liquid 5 mL, 5 mL, Oral, Q6H PRN  benzonatate (TESSALON) cap 100 mg, 100 mg, Oral, TID PRN  amLODIPine ALB 2.8* 2.8* 2.8*   * 572* 542*           Imaging:  Reviwed    Impression:  1. Acute respiratory failure due to COVID 19 pneumonia; intubated on 5/11  - s/p tociluzumab + plaquenil. - pulm following  2.  Hyponatremia - acute ; she is somewhat hyp

## 2020-05-13 NOTE — OCCUPATIONAL THERAPY NOTE
Attempted to see pt for skilled OT services this date. Pt remains vented. Will re-attempt when medically appropriate.  Henna Juarez, 05/13/20, 7:42 AM

## 2020-05-13 NOTE — PLAN OF CARE
Pt received this AM, intubated, precedex and fentanyl gtts, wakes to voice but unable to follow commands. Tolerating vent settings but becomes tachypneic to mid to upper 30's when sedation lightened. Tolerated pressure support trial today per RT.   Dexph

## 2020-05-13 NOTE — PLAN OF CARE
Attempting to sedate to RASS -2 per ordered goal, thus far -3 although even though she wouldn't follow commands or open her eyes to verbal stimulation she helped hold herself on her left side for 20 min for juani care and is tachypneic.   Prior to intubation

## 2020-05-13 NOTE — CM/SW NOTE
MSBHAKTI called [facility] Dani [phone] to find out if patient was sent home with [facility] after SNF stay in March 2020. Per Admissions at [facility] was set up. Patient has had several stays at [facility].     JESSE called [address] [place] [address] [phone]- a

## 2020-05-13 NOTE — RESPIRATORY THERAPY NOTE
Received patient on vent with the following settings: VC+ RR20 Vt360 45% +5 and tolerating well. Minimal secretions suctioned from ETT. No acute events this shift. Will continue to wean,assess and monitor.

## 2020-05-13 NOTE — PROGRESS NOTES
[facility]  Progress Note    Chepe Grimes Patient Status:  Inpatient    1933 MRN [id number]   [facility] 4SW-A Attending Jann Pruitt MD   Hosp Day # 8 PCP David Rm MD     Subjective:  Chepe Zimmermanzaneaide is a(n) 80 ye 11.7* 11.2* 11.3*   HCT 36.4 34.3* 35.1   .0* 674.0* 729.0*       Recent Labs   Lab 05/11/20  0454 05/11/20  1806 05/12/20  0425 05/12/20  0953 05/13/20  0416   GLU 87  --  125*  --  144*   BUN 16  --  30*  --  63*   CREATSERUM 0.71  --  0.93  --  1 CKMBINDEX    Cultures:  NGTD    Radiology:  No new chest imaging    Medications reviewed.   • pantoprazole (PROTONIX) IV push  40 mg Intravenous QAM AC    Or   • Pantoprazole Sodium  40 mg Oral QAM AC   • docusate sodium  100 mg Oral BID   • Chlorhexidine G clinical deterioration.

## 2020-05-13 NOTE — PROGRESS NOTES
[facility]  Nephrology Progress Note    Jose Santos Patient Status:  Inpatient    1933 MRN [id number]   [facility] 4SW-A Attending Mane Mancilla MD   Hosp Day # 8 PCP Kristi Mansfield MD       SUBJECTIVE:  Stable this AM, K 3.9 3.7 3.6  --  4.7 4.7 4.2   CL 91* 96* 96*  --  103  --  104   CO2 24.0 27.0 27.0  --  23.0  --  20.0*   BUN 11 14 16  --  30*  --  63*   CREATSERUM 0.65 0.86 0.71  --  0.93  --  1.29*   CA 9.1 8.8 8.8  --  8.7  --  8.4*   MG  --   --   --   --   -- (DECADRON) 20 mg in sodium chloride 0.9% 102 mL IVPB, 20 mg, Intravenous, Daily  norepinephrine (LEVOPHED) 4 mg/250 ml premix infusion, 0.5-30 mcg/min, Intravenous, Continuous  Enoxaparin Sodium (LOVENOX) 30 MG/0.3ML injection 30 mg, 0.5 mg/kg, Subcutaneou

## 2020-05-13 NOTE — PROGRESS NOTES
Current vent settings noted below. BS have been mostly clear, somewhat diminished in the bases. No events overnight.        05/13/20 0404   Vent Information   $ RT Standby Charge (per 15 min) 1  (vent check)   $ Vent Care / Non-Invasive Subsequent Day Yes

## 2020-05-14 NOTE — PLAN OF CARE
Assumed care of patient at 0700. Patient intubated and sedated on precedex and fentanyl gtts. Patient HUYNH spontaneously. Received patient on 60% FiO2, increased work of breathing. O2 sats < 90%. Dr. García Look at bedside, orders to begin proning patient.  Anisha restrictions as appropriate  Outcome: Progressing  Goal: Glucose maintained within prescribed range  Description  INTERVENTIONS:  - Monitor Blood Glucose as ordered  - Assess for signs and symptoms of hyperglycemia and hypoglycemia  - Administer ordered me Evaluate effectiveness of GI medications  - Encourage mobilization and activity  - Obtain nutritional consult as needed  - Establish a toileting routine/schedule  - Consider collaborating with pharmacy to review patient's medication profile  Outcome: Progr

## 2020-05-14 NOTE — PROGRESS NOTES
05/14/20 0442   Vent Information   $ Vent Care / Non-Invasive Subsequent Day Yes   Ventilator Initiation 05/11/20   Ventilation Day(s) 4   Interface Invasive   Vent Type    Vent -2   Vent Mode VC+   Settings   FiO2 (%) 55 %   Resp Rate (Set)

## 2020-05-14 NOTE — PLAN OF CARE
Assumed care of patient at 1900. Intubated on ventilator. FiO2 titrated per patient tolerance. Precedex and fentanyl infusing for sedation and pain control. Levophed titrated to maintain SBP 90/MAP 60. Tolerating tube feeding at goal rate via OG tube.  Fole

## 2020-05-14 NOTE — PHYSICAL THERAPY NOTE
Following pt for PT services. Pt remains on ventilator. Will f/u for further in-person visits when medically appropriate.

## 2020-05-14 NOTE — PROGRESS NOTES
[facility]  Progress Note    Arely Upton Patient Status:  Inpatient    1933 MRN [id number]   [facility] 4SW-A Attending Mark Malone MD   Hosp Day # 9 PCP Ozie Severance, MD     Subjective:  Arely Won is a(n) 80 ye BS   Extremity: trace edema, no cyanosis   Neurological: intubated/sedated    Lab Data Review:  Recent Labs   Lab 05/12/20  0425 05/13/20  0416 05/14/20  0432   WBC 14.7* 15.4* 10.1   HGB 11.2* 11.3* 9.7*   HCT 34.3* 35.1 30.4*   .0* 729.0* 563.0* Lab 05/07/20  1705 05/11/20  0454   TROP <0.045 <0.045         Invalid input(s): CKTOTAL, TROPONINI, TROPONINT, CKMBINDEX    Cultures:  NGTD    Radiology:  Today's film pending    Medications reviewed.   • pantoprazole (PROTONIX) IV push  40 mg Intravenou ICU      Domi Khanna MD  [facility]  CCM time: 45 minutes. The patient is critically ill and at high risk for clinical deterioration.

## 2020-05-14 NOTE — PROGRESS NOTES
05/13/20 1600   Vent Information   $ RT Standby Charge (per 15 min) 1   Ventilator Initiation 05/11/20   Ventilation Day(s) 3   Interface Invasive   Vent Type    Vent -2   Vent Mode VC+   Settings   FiO2 (%) 45 %   Resp Rate (Set) 20   Vt (Se

## 2020-05-14 NOTE — OCCUPATIONAL THERAPY NOTE
Attempted to see pt for skilled OT services this date. Pt remains vented. Will re-attempt when medically appropriate.      Thank you for allowing me to care for this patient,   Ashwini Rodríguez, OTR/L   Occupational Therapist   [facility]   Pager: 2

## 2020-05-14 NOTE — PROGRESS NOTES
[facility]  Progress Note     Tahmina Laurenpadmini Patient Status:  Inpatient    1933 MRN [id number]   [facility] 4SW-A Attending Theron Harris MD   Hosp Day # 9 PCP Binh Staley MD     Chief Complaint: resp failure    S: P <0.045       Creatinine Kinase  No results for input(s): CK in the last 168 hours. Inflammatory Markers  Recent Labs   Lab 05/10/20  0435  05/12/20  0425 05/13/20  0416 05/14/20  0423 05/14/20  0844   CRP 3.22*   < > 2.01* 1.78* 0.63*  --    NORAH 1,850. 3

## 2020-05-15 NOTE — PLAN OF CARE
Assumed care at shift change. Patient sedated on precedex, fentanyl. Given versed pushes and fentanyl as needed. Opens eyes to verbal stimuli. Minimal response to pain. Used daughter to communicate over the phone but unable to follow commands.  Arely GRISSOM

## 2020-05-15 NOTE — PROGRESS NOTES
[facility]  Nephrology Progress Note    Schuyler Paula Patient Status:  Inpatient    1933 MRN [id number]   [facility] 4SW-A Attending Latesha Martino MD   Hosp Day # 10 PCP Keo Tya MD       SUBJECTIVE:  Stable this AM, 05/15/20  0412   * 138 135*  --  134* 137 140   K 3.6  --  4.7 4.7 4.2 4.1 4.1   CL 96*  --  103  --  104 105 105   CO2 27.0  --  23.0  --  20.0* 23.0 27.0   BUN 16  --  30*  --  63* 67* 56*   CREATSERUM 0.71  --  0.93  --  1.29* 0.94 0.84   CA 8.8 MG/2ML injection 2 mg, 2 mg, Intravenous, Q5 Min PRN  Dexmedetomidine HCl in NaCl (PRECEDEX) 400 MCG/100ML premix infusion, 0.2-1.5 mcg/kg/hr (Dosing Weight), Intravenous, Continuous  norepinephrine (LEVOPHED) 4 mg/250 ml premix infusion, 0.5-30 mcg/min, I

## 2020-05-15 NOTE — RESPIRATORY THERAPY NOTE
Received pt vented, VC+ 15/360/40%/+10, tolerating well. No changes made. Suctioned small amount of clear secretions from ETT. Pt was supined around 0330. Will continue to monitor closely.

## 2020-05-15 NOTE — OCCUPATIONAL THERAPY NOTE
Pt being followed for skilled OT services. Will place pt ON HOLD for OT as pt remains vented. Pt will need new OT orders when medically/clinically appropriate.      Thank you for allowing me to care for this patient,   Bharat ARAGON, OTR/L   Occupatio

## 2020-05-15 NOTE — PROGRESS NOTES
[facility]  Progress Note     Arturo Furazucena Patient Status:  Inpatient    1933 MRN [id number]   [facility] 4SW-A Attending Jalyn Hobbs MD   Hosp Day # 10 PCP Elvia Garrison MD     Chief Complaint: resp failure    S: 1,463.7*   * 340*  --  375*   DDIMER 1.80*  --  1.30* 1.94*       Imaging: Imaging data reviewed in Epic.     Medications:   • pantoprazole (PROTONIX) IV push  40 mg Intravenous QAM AC    Or   • Pantoprazole Sodium  40 mg Oral QAM AC   • docusate sod

## 2020-05-15 NOTE — DIETARY NOTE
[facility]    NUTRITION ASSESSMENT    Pt does not meet malnutrition criteria.     NUTRITION DIAGNOSIS/PROBLEM:  Inadequate oral intake related to physiological causes as evidenced by intubated/sedated, NPO status. -ongoing    NUTRITION INTERVENTION: Accumulation: none per visual exam.    NUTRITION PRESCRIPTION:  Calories: 1188 calories/day [facility] Calculation Tmax 37; VE 9.5)  Protein: 68 grams protein/day (1.5 grams protein per kg IBW)  Fluid: ~1 ml/kcal or per MD discretion    MONITOR AND Daisy Bernheim

## 2020-05-15 NOTE — PLAN OF CARE
Received pt intubated and sedated on fentanyl and precedex. Received pt in the prone position. Pt tolerating proning. Pt's O2 sats 97%. Lungs are clear, but diminished. FIO2 40%. Levophed titrated to keep SBP >90 and MAP >65.   OG to low intermittent suctio

## 2020-05-15 NOTE — CM/SW NOTE
Plan of care reviewed for care coordination and discharge planning.  Pt remains in critical condition and being treated for Acute respiratory failure due to COVID 19 pneumonia, intubated on 5/11, Afib with RVR, and with diastolic heart failure, TRACE, and ANGELIKA

## 2020-05-15 NOTE — PROGRESS NOTES
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 · Tolerating tube feeds  · Mobilize/OOBTC as able  · Ppx: SCDs, LMWH BID, PPI  · Dispo: ICU. Prognosis poor. Critical care time 55 minutes.   Discussed w Dr. Lalitha Gould, RN, APN, RT and Dr. Arvind Brown

## 2020-05-16 NOTE — PROGRESS NOTES
[facility]  Progress Note    [facility] House Patient Status:  Inpatient    1933 MRN [id number]   [facility] 4SW-A Attending Annie Cheney MD   Hosp Day # 11 PCP Aaron Holliday MD     Subjective:  Liam Null is a(n) 80 y WBC 10.1  --  13.2* 13.1*   HGB 9.7* 9.6* 10.5* 9.6*   HCT 30.4*  --  33.8* 30.9*   .0*  --  579.0* 478.0*       Recent Labs   Lab 05/14/20  0423 05/15/20  0412 05/16/20  0459   * 144* 163*   BUN 67* 56* 52*   CREATSERUM 0.94 0.84 0.79   GF Phosphate  10 mg Intravenous Daily   • piperacillin-tazobactam  3.375 g Intravenous Q8H   • pantoprazole (PROTONIX) IV push  40 mg Intravenous QAM AC    Or   • Pantoprazole Sodium  40 mg Oral QAM AC   • docusate sodium  100 mg Oral BID   • Chlorhexidine Gl MD GEOVANNA Garcia  CCM time: 50 minutes. The patient is critically ill and at high risk for clinical deterioration.

## 2020-05-16 NOTE — PLAN OF CARE
Assumed pt care at 1. Pt intubated and sedated on a Precedex gtt. Fentanyl continuously infusing for pain control (see MAR).  Pt unable to following commands, however, opens eyes to speech/touch, pupils pinpoint, sluggish to react to light (see assessmen response to electrolyte replacements, including rhythm and repeat lab results as appropriate  - Fluid restriction as ordered  - Instruct patient on fluid and nutrition restrictions as appropriate  Outcome: Progressing  Goal: Glucose maintained within presc baseline bowel function  Description  INTERVENTIONS:  - Assess bowel function  - Maintain adequate hydration with IV or PO as ordered and tolerated  - Evaluate effectiveness of GI medications  - Encourage mobilization and activity  - Obtain nutritional con void and empty bladder  - Monitor intake/output and perform bladder scan as needed  - Follow urinary retention protocol/standard of care  - Consider collaborating with pharmacy to review patient's medication profile  - Implement strategies to promote bladd Assess for the need to continue restraints  Outcome: Progressing     Problem: Delirium  Goal: Minimize duration of delirium  Description  Interventions:  - Encourage use of hearing aids, eye glasses  - Promote highest level of mobility daily  - Provide burt

## 2020-05-16 NOTE — PROGRESS NOTES
Received patient on 15/360/40%/+10, PEEP weaned to +8 and tolerating well with sats remaining in upper 90s. Moved to supine position this AM. BS are mostly diminished. Sputum trap in line for sample.        05/16/20 0089   Vent Information   $ RT Standby Ch

## 2020-05-16 NOTE — PROGRESS NOTES
[facility]  Progress Note     Calvin Dust Patient Status:  Inpatient    1933 MRN [id number]   [facility] 4SW-A Attending Lukas Lane MD   Hosp Day # 6 PCP Veena Washburn MD     Chief Complaint: resp failure    S: CRP 0.63*  --  <0.29 <0.29   NORAH 1,216.1*  --  1,463.7* 1,573.4*   *  --  375* 318*   DDIMER  --  1.30* 1.94* 1.57*       Imaging: Imaging data reviewed in Epic.     Medications:   • furosemide  20 mg Intravenous Daily   • dexamethasone Sodium Phos

## 2020-05-16 NOTE — PROGRESS NOTES
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     Meds:   furosemide (LASIX) injection 20 mg, 20 mg, Intravenous, BID (Diuretic)  dexamethasone Sodium Phosphate (DECADRON) 4 per day  lidocaine-menthol 4-1 % 1 patch, 1 patch, Transdermal, Daily  Acetaminophen-Codeine #3 (TYLENOL #3) 300-30 MG tab 1 tablet, 1 tablet, Oral, Q4H PRN  dextromethorphan-guaiFENesin (ROBITUSSIN-DM)  MG/5ML liquid 5 mL, 5 mL, Oral, Q6H PRN  benzo

## 2020-05-17 NOTE — PROGRESS NOTES
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     Meds:   potassium chloride (KLOR-CON) powder packet 40 mEq, 40 mEq, Oral, Q4H  furosemide (LASIX) injection 20 mg, 20 mg, Intravenous, Daily  dexamethasone Sodium Phosphate (DECADRON) 4 MG/ML injection 10 mg day  lidocaine-menthol 4-1 % 1 patch, 1 patch, Transdermal, Daily  Acetaminophen-Codeine #3 (TYLENOL #3) 300-30 MG tab 1 tablet, 1 tablet, Oral, Q4H PRN  dextromethorphan-guaiFENesin (ROBITUSSIN-DM)  MG/5ML liquid 5 mL, 5 mL, Oral, Q6H PRN  benzonata

## 2020-05-17 NOTE — PLAN OF CARE
Assumed care of patient for the night. Patient sedated and on ventilator in prone position. Head and arms repositioned q2h. Sedated with precedex and fentanyl. Patient will open eyes and sometimes track.  Very weak extremities, very slight response to painf

## 2020-05-17 NOTE — PROGRESS NOTES
[facility]  Progress Note    Lalito Agarwal Patient Status:  Inpatient    1933 MRN [id number]   [facility] 4SW-A Attending Teresa Rivera MD   Hosp Day # 12 PCP Donovan Dawson MD     Subjective:  Lalito Agarwal is a(n) 80 y 9. 5   HGB 10.5* 9.6* 9.7*   HCT 33.8* 30.9* 30.7*   .0* 478.0* 413.0       Recent Labs   Lab 05/14/20  0423 05/15/20  0412 05/16/20  0459 05/17/20  0422   * 144* 163* 156*   BUN 67* 56* 52* 55*   CREATSERUM 0.94 0.84 0.79 0.71   GFRAA 63 72 7 20 mg Intravenous Daily   • dexamethasone Sodium Phosphate  10 mg Intravenous Daily   • piperacillin-tazobactam  3.375 g Intravenous Q8H   • pantoprazole (PROTONIX) IV push  40 mg Intravenous QAM AC    Or   • Pantoprazole Sodium  40 mg Oral QAM AC   • docu able  · Ppx: SCDs, LMWH BID, PPI  · Dispo: ICU. Prognosis poor. Domi Khanna MD  [place] [facility] [place]  CCM time: 45 minutes. The patient is critically ill and at high risk for clinical deterioration.     Addendum 5/17 @ [phone]

## 2020-05-17 NOTE — PROGRESS NOTES
[facility]  Progress Note     [facility] Patient Status:  Inpatient    1933 MRN [id number]   [facility] 4SW-A Attending Heidi Saleh MD   Hosp Day # 12 PCP Matt Sparks MD     Chief Complaint: resp failure    S: hours.    Inflammatory Markers  Recent Labs   Lab 05/14/20  0844 05/15/20  0412 05/16/20  0459 05/17/20  0422   CRP  --  <0.29 <0.29 <0.29   NORAH  --  1,463.7* 1,573.4* 1,629.7*   LDH  --  375* 318* 369*   DDIMER 1.30* 1.94* 1.57*  --        Imaging: Imagin

## 2020-05-17 NOTE — PROGRESS NOTES
Current vent settings noted below. PEEP weaned to +6 overnight tolerated well with sats in low 90s while currently supine.  Suctioning small amounts of thin white secretions via ETT.       05/17/20 4446   Vent Information   $ RT Standby Charge (per 15 min)

## 2020-05-17 NOTE — PLAN OF CARE
Assumed care from night shift RN. Patient intubated. 40% FIO2 Peep 6  Precedex and fentanyl gtt for sedation. RASS goal -2. Afib on monitor. Cardizem gtt per afib protocol  Levo gtt for SBP >90  Tolerating TF. Patient turned to prone position at noon.

## 2020-05-18 NOTE — PROGRESS NOTES
[facility]  Progress Note     Venia File Patient Status:  Inpatient    1933 MRN [id number]   [facility] 4SW-A Attending Pernell Castleman, MD   Hosp Day # 15 PCP Randell Adams MD     Chief Complaint: resp failure    S: 168 hours.     Inflammatory Markers  Recent Labs   Lab 05/14/20  0844 05/15/20  0412 05/16/20  0459 05/17/20  0422   CRP  --  <0.29 <0.29 <0.29   NORAH  --  1,463.7* 1,573.4* 1,629.7*   LDH  --  375* 318* 369*   DDIMER 1.30* 1.94* 1.57*  --        Imaging: Im

## 2020-05-18 NOTE — PROGRESS NOTES
[facility]  Progress Note    Magali Olvera Patient Status:  Inpatient    1933 MRN [id number]   [facility] 4SW-A Attending Serge Mccarthy MD   Hosp Day # 15 PCP Umair Nguyen MD     Subjective:  Magali Olvera is a(n) 80 y 23.0 27.0 26.0 26.0  --    ALKPHO 82 85 67  --   --    AST 29 41* 32  --   --    ALT 25 31 30  --   --    BILT 0.6 1.0 1.0  --   --    TP 6.5 6.7 6.3*  --   --        Recent Labs   Lab 05/13/20  0416 05/17/20  0422   MG 2.5 2.3       Lab Results   Componen APN, RT    Dana Abbasi MD, [facility]

## 2020-05-18 NOTE — PROGRESS NOTES
05/18/20 1316   Clinical Encounter Type   Visited With Health care provider;Family   Gnosticism Encounters   Gnosticism Needs Prayer   Patient Spiritual Encounters   Spiritual Interventions  provided a spiritual care call to family member Jolene Santiago

## 2020-05-18 NOTE — PLAN OF CARE
Received pt. Sedated, on ventilator, transitioned to comfort care and extubated. Afebrile. A-fib noted on monitor. Rectal tube in place. Urine draining with esposito catheter in place. No s/s of distress. Family on FaceTime.  Updated on pt status and plan

## 2020-05-18 NOTE — PLAN OF CARE
Received patient resting in bed, intubated and on ventilator with +6 PEEP and 40% FiO2. Precedex and fentanyl gtts infusing. Remains in a-fib, HR controlled with cardizem gtt. Weaning levophed as tolerated. Plan to initiate comfort care in the morning.  Gif

## 2020-05-18 NOTE — PROGRESS NOTES
05/18/20 1327   Clinical Encounter Type   Visited With Family   Patient Spiritual Encounters   Spiritual Interventions  spoke with a second family member/provided spiritual care/grief support provided by phone.

## 2020-05-18 NOTE — PROGRESS NOTES
05/18/20 0346   Vent Information   $ RT Standby Charge (per 15 min) 1   $ Vent Care / Non-Invasive Subsequent Day Yes   Ventilator Initiation 05/11/20   Ventilation Day(s) 8   Interface Invasive   Vent Type    Vent ID 2   Vent Mode VC+   Settings

## 2020-05-19 NOTE — PLAN OF CARE
Assumed care of patient at 1900. Ativan and morphine drips infusing for comfort measures. Comfort care in place. APN notified of increased heart rate-see orders. Left radial arterial line remains in place. Welch catheter with decreased urine output noted.

## 2020-05-27 NOTE — DISCHARGE SUMMARY
[facility]  DISCHARGE SUMMARY     Michelle Burnette Patient Status:  Inpatient    1933 MRN [id number]   [facility] 4SW-A Attending No att. providers found   Hosp Day # 14 PCP Earl Burton MD     Date of Admission: 2020

## 2024-10-04 NOTE — RESPIRATORY THERAPY NOTE
Pt remain stable on vent setting of RR 20  FiO2 45% Peep 5. Breath sounds are clear with few secretions. Pt tolerated PS trial well for one hour, although did not follow commands. Displaced fracture of shaft of left clavicle, initial encounter for closed fracture

## (undated) NOTE — IP AVS SNAPSHOT
[address]            (For Outpatient Use Only) Initial Admit Date: 2/12/2019   Inpt/Obs Admit Date: Inpt: N/A / Obs: 02/12/19   Discharge Date:    Hospital Acct:  [de-identified]   MRN: [de-identified]   CSN: [id number]        [facility] Hospital Account Financial Class: Medicare Advantage    February 14, 2019

## (undated) NOTE — IP AVS SNAPSHOT
[address]            (For Outpatient Use Only) Initial Admit Date: 3/17/2020   Inpt/Obs Admit Date: Inpt: N/A / Obs: 03/17/20   Discharge Date:    Hospital Acct:  [de-identified]   MRN: [de-identified]   CSN: [id number]   CEID: [id number] Subscriber ID:  Pt Rel to Subscriber:    Hospital Account Financial Class: Medicare    March 19, 2020

## (undated) NOTE — IP AVS SNAPSHOT
Patient Demographics     Address  [facility] [place] [address] Phone  [phone] [facility])  [phone] (Mobile) *Preferred*      Emergency Contact(s)     Name Relation Home Work Cite Zak Mott Son [phone]      [facility] 4 mg by Nasal route as needed. If patient remains unresponsive, repeat dose in other nostril 2-5 minutes after first dose.    Matt Sparks MD         NON FORMULARY  Next dose due:  Resume as directed       Thyroid med          OxyCODONE HCl ER 10 MG 02/14/19 1005 Given            RIGHT LOWER ABDOMEN     Order ID Medication Name Action Time Action Reason Comments    [id number] Heparin Sodium (Porcine) 5000 UNIT/ML injection 5,000 Units 02/13/19 2116 Given              Recent Vital Signs       Most Recen CBC WITH DIFFERENTIAL WITH PLATELET [[id number]]  Resulted: 02/14/19 0543, Result status: Final result   Ordering provider:  Selin Hernandez MD  02/13/19 0538 Resulting lab:  [facility]   Narrative:   The following orders were created for panel order CB Reason for Consultation:  Sacral fracture pain    History of Present Illness:  Tessa Nur is a a(CPn) 80year old female. Pt has sacral fracture pain. She is consulted for acute pain control.  No CP or SOB    History:  Past Medical History:   Diagnosis D •  0.9%  NaCl infusion, , Intravenous, Continuous    Review of Systems:  Pertinent items are noted in HPI. Physical Exam:  Pt is awake and alert but unable to communicate due to language barrier. CV: regular. Lungs: no wheezing noted.     Laboratory Da History related to current admission: Patient admitted on 2/12/19 with Right LE & low back pain resulting into difficulty to ambulate & care for self. PMH as listed below.    MRI Lumbar spine : Right sacral ala are fracture is again noted.  There is sugges AM-PAC '6-Clicks' INPATIENT SHORT FORM - BASIC MOBILITY  How much difficulty does the patient currently have. ..[JM.1]  -   Turning over in bed (including adjusting bedclothes, sheets and blankets)?: A Little   -   Sitting down on and standing up from a aquilino Lower Extremity Alternating marching  Ankle pumps  Heel raises  LAQ         Position Sitting     Repetitions   10   Sets   1[JM.1]     Patient End of Session: Up in chair;Needs met;Call light within reach;RN aware of session/findings; All patient questions FAUSTO.1 - Magali Coleman, PT on 2/14/2019 12:42 PM  FAUSTO. 2 - Magali Coleman, PT on 2/14/2019 12:44 PM               Physical Therapy Note signed by Cindy Yang PT at 2/13/2019 11:41 AM  Version 1 of 1    Author:  Cindy Yang, PT Service:  Rehab A Patient Owned Equipment: Cane;Rolling walker  Patient Regularly Uses: Glasses    Prior Level of Frio: Patient lives with  & son + his family.  Patient was independent with ADLs & self care + ambulation without AD in home & used cane when out ACTIVITY TOLERANCE                         O2 WALK                  AM-PAC '6-Clicks' INPATIENT SHORT FORM - BASIC MOBILITY  How much difficulty does the patient currently have. ..  -   Turning over in bed (including adjusting bedclothes, sheets and bl Gait training  Neuromuscular re-educate  Posture  Transfer training    Patient End of Session: Up in chair;Needs met;Call light within reach;RN aware of session/findings; All patient questions and concerns addressed; Family present; Discussed recommendations conservation;Patient education; Family education;Gait training;Neuromuscular re-educate;Stair training;Transfer training;Balance training  Rehab Potential : Good  Frequency (Obs): Daily  Number of Visits to Meet Established Goals: 3      CURRENT GOALS    Go Diagnosis Date   • Back problem    • Cataract    • Disorder of thyroid    • Essential hypertension    • High blood pressure    • Osteoarthritis    • Thyroid disease        Past Surgical History  Past Surgical History:   Procedure Laterality Date   • CATARA completed toileting with min A for diaper management. Patient completed func mob in hallway with RW and increased time, tolerated approx 50 feet with heavy reliance on RW. Gene's son present for session and provided interpreting. [MM.2]   Patient End of Author:  Jayne Cabrales OT Service:  Rehab Author Type:  Occupational Therapist    Filed:  2/13/2019 12:52 PM Date of Service:  2/13/2019 11:57 AM Status:  Signed    :  Jayne Cabrales OT (Occupational Therapist)       OCCUPATIONAL THERAPY EV hours/day. Spouse is elderly and cannot assist patient with transfers. Son , MICHAEL work FT. Typically does not use AD and is independent with self care. [MM.1] Patient likes to take tub baths but since back pain increased has been showering .  Patient wears bl -   Taking care of personal grooming such as brushing teeth?: None  -   Eating meals?: None    AM-PAC Score:  Score: 21  Approx Degree of Impairment: 32.79%  Standardized Score (AM-PAC Scale): 44.27  CMS Modifier (G-Code): ATILIO    FUNCTIONAL TRANSFER ASSESSM The patient is functioning below her previous functional level and would benefit from skilled inpatient OT to address the above deficits, maximizing patient’s ability to return safely to her prior level of function.     The AM-SAGE ' '6-Clicks' Inpatient Dilip Jaramillo Number of Visits to Meet Established Goals: 2       ADL GOALS   Patient will perform Lower Body Dressing with supervision  Patient will perform Toileting with supervision    FUNCTIONAL TRANSFER GOALS   Patient will transfer to Toilet with supervision[MM. 1]

## (undated) NOTE — ED AVS SNAPSHOT
Milo Quinn   MRN: [id number]    Department:  [facility] Emergency Department   Date of Visit:  2/6/2019           Disclosure     Insurance plans vary and the physician(s) referred by the ER may not be covered by your plan.  Please contact your i tell this physician (or your personal doctor if your instructions are to return to your personal doctor) about any new or lasting problems. The primary care or specialist physician will see patients referred from the [facility] Emergency Department.  Earline Bear

## (undated) NOTE — LETTER
[address] [facility]     I agree to have a Peripherally Inserted Central Catheter (PICC) placed in my arm.    1. The PICC insertion procedure, care, maintenance, risks, benefits, and complications have been explained to me by my physic also discussed reasonable alternatives to the PICC, including risks, benefits, and side effects related to the alternatives and risks related to not receiving this procedure.     8.  I have expressed any questions about this procedure to my physician or the

## (undated) NOTE — IP AVS SNAPSHOT
Patient Demographics     Address  [facility] [address] [address] Phone  [phone] [facility])  [phone] (Mobile) *Preferred*      Emergency Contact(s)     Name Relation Home Work Cite Zak Conley Diogenesshari Son [phone]      A Take 500 mg by mouth every 6 (six) hours as needed for Pain. ferrous sulfate 325 (65 FE) MG Tbec      Take 1 tablet (325 mg total) by mouth 2 (two) times daily with meals.    Lee Ann Keys MD         HYDROcodone-acetaminophen 5-325 MG Tabs  Common [id number] HYDROcodone-acetaminophen (NORCO)  MG per tab 1 tablet 03/19/20 0536 Given      [id number] HYDROcodone-acetaminophen (NORCO)  MG per tab 1 tablet 03/19/20 1128 Given      [id number] HYDROcodone-acetaminophen (NORCO)  MG per tab 1 Specimen Information    Type Source Collected On   Blood — 03/19/20 0510          Components    Component Value Reference Range Flag Lab   Retic% 2.1 0.5 - 2.5 % — [facility] Absolute 81.5 22.5 - 147.5 x10(3) uL — [place] [facility]   Retic IRF 0.184 0.10 Ordering provider:  Jannet Scott MD  03/18/20 2300 Resulting lab:  [facility]   Narrative: The following orders were created for panel order CBC WITH DIFFERENTIAL WITH PLATELET.   Procedure                               Abnormality         Status [address]ne [address] [facility] Cathleen [address]ler[address] [id number] 02/03/16 1201 - Present            Microbiology Results (All)     None      Pending Labs     Order Current Status    PATH COMMENT CBC In process         H&P - H traMADol HCl 50 MG Oral Tab, Take 50 mg by mouth every 8 (eight) hours as needed for Pain.  , Disp: , Rfl:   HYDROcodone-acetaminophen 5-325 MG Oral Tab, Take 1 tablet by mouth every 6 (six) hours as needed for Pain., Disp: , Rfl:   PEG 3350 Oral Powd Pack Integument: No rashes or lesions. Psychiatric: Appropriate mood and affect. Diagnostic Data:      Labs:  No results for input(s): WBC, HGB, MCV, PLT, BAND, INR in the last 168 hours.     Invalid input(s): LYM#, MONO#, BASOS#, EOSIN#    No results for Location [address] [place] [address] Attending Consuelo Moreno MD   [facility] Day #[MO.1] 0[MO. 2] PCP[MO.1] Binh Staley MD[MO. 2]     REASON FOR CONSULTATION:  Lumbar compression fractures    HISTORY OF PRESENT ILLNESS:[MO.1]  Liane Zaldivar[ reports that she has never smoked. She has never used smokeless tobacco.[MO.2]    ALLERGIES:[MO.1]  No Known Allergies[MO. 2]    MEDICATIONS:[MO.1]  (Not in a hospital admission)    traMADol HCl (ULTRAM) tab 50 mg, 50 mg, Oral, Q6H PRN  HYDROcodone-acetami 80year old female w/ mild L2, moderate L3 compression fractures- acute vs subacute      Plan:  Discussed with Dr. Jaime Horner  No acute[MO.1] spine[KJ.2] surgical intervention[MO.1]. [KJ.2]  Pain is poorly controlled at this time[MO.1]. [KJ.2]  Recommend admitt Room Number: 367/367-A  Evaluation Date: 3/19/2020  Type of Evaluation: Re-evaluation[CM.1]  Physician Order: PT Eval and Treat    Presenting Problem: lumbar compression fractures[CM. 2]  Reason for Therapy: Mobility Dysfunction and Discharge Planning    Hi heights are otherwise maintained in the thoracic spine. There is associated mild retropulsion of bone and disc bulge at T11-T12 that is causing moderate spinal canal stenosis. Problem List[CM. 1]  Principal Problem:    Closed fracture of one rib of lef Precautions: TLSO;Spine;  needed(Armani)[CM.2]  to fit for and deliver TLSO brace- Btrace does not need to be worn in the bed, in a chair, or ambulating < 20 feet from those positions.   The pt to wear when OOB Per Dr. Faith Gould not -   Moving to and from a bed to a chair (including a wheelchair)?: A Little   -   Need to walk in hospital room?: A Little   -   Climbing 3-5 steps with a railing?: A Little[CM. 2]       AM-PAC Score:[CM.1]  Raw Score: 18   Approx Degree of Impairment: 46. 5 Pertinent comorbidities and personal factors impacting therapy include HTN, osteoarthritis, cataract, and thyroid disease.   In this PT evaluation, the patient presents with the following impairments decreased strength, decreased balance, decreased enduranc 1:27 PM  Version 1 of 1    Author:  Deb Estrada PT Service:  — Author Type:  Physical Therapist    Filed:  3/17/2020  1:27 PM Date of Service:  3/17/2020  1:10 PM Status:  Signed    :  Deb Estrada PT (Physical Therapist) HOME SITUATION  Type of Home: House   Home Layout: Two level; Able to live on main level  Stairs to Enter : 2  Railing: Yes  Stairs to Bedroom: 0       Lives With: Spouse;Son;Family  Drives: No  Patient Owned Equipment: Rolling walker(wheelchair)  Patient R Upper extremity ROM and strength: difficulty to assess and pt is guarding abdomen and holding BUE's folded across her body, tightly. Pt will only move them about 15% aware from this position, cries in pain, and returns them.      Lower extremity ROM is = sa Mod assist and vc's for rolling R and L x 2  Each way diaper change. Pt then refusing to mobilize anymore due to pain.      Exercise/Education Provided:  Bed mobility  Functional activity tolerated  Transfer training    Patient End of Session: In bed;Need supervision/vc's. Goal #2  Patient is able to participate in further mobility assessment. Goal #3   Pt is able to maintain side lying balance for brief change with supervision.     Goal #4          Goal #6        Goal Comments: Goals established on 3/ MRI of the lumbar spine ordered to evaluate the canal.  She may be a candidate for vertebroplasty by interventional radiology in the future.   If no improvement with TLSO brace and pain medication, consider kyphoplasty for pain management        Maylin Vasquez Rating: Unable to rate  Location: (back)  Management Techniques: Relaxation;Repositioning[MM. 2]    COGNITION  Overall Cognitive Status:  BHASKAR - unable to assess and appears WFL, limited by language barrier-no fmaily visitor allowed and ipad not working    V brief, max A needed for hygiene and min A to roll. Patient required mod A for trunk from side lying to sitting. Max A needed to don TLSO, sit to stand completed with min A, TLSO required adjustment in standing .   Patient used WR to ambulate approx 3 feet w Occupational Profile/Medical History[MM. 1] LOW - Brief history including review of medical or therapy records[MM.4]    Specific performance deficits impacting engagement in ADL/IADL[MM. 1] MODERATE  3 - 5 performance deficits[MM. 4]   Client Assessment/Perfo :  Sera John OT (Occupational Therapist)    Related Notes:  Addendum by Sera John OT (Occupational Therapist) filed at 3/19/2020  1:48 PM       OCCUPATIONAL THERAPY EVALUATION - INPATIENT     Room Number: 367/367-A  Evaluation Date • Thyroid disease[MM. 2]        Past Surgical History[MM. 1]  Past Surgical History:   Procedure Laterality Date   • CATARACT     • KNEE SURGERY     • TOTAL KNEE [address] [facility] SITUATION[MM. 1]  Type of Home: House  Dianna NEUROLOGICAL FINDINGS                   ACTIVITY TOLERANCE                         O2 SATURATIONS                ACTIVITIES OF DAILY LIVING ASSESSMENT  AM-PAC ‘6-Clicks’ Inpatient Daily Activity Short Form  How much help from another person does the patien Complete medical history and occupational profile noted above. Functional outcome measures completed include[MM. 1] AMPAC[MM.3].  In this OT evaluation patient presents with the following performance deficits:[MM.1] TLSO, spine precautions, pain, decreased a training;Equipment eval/education; Compensatory technique education  Rehab Potential : Fair  Frequency (Obs): 3x/week  Number of Visits to Meet Established Goals: 3[MM. 2]    ADL Goals   Patient will perform grooming: with supervision and while standing at s

## (undated) NOTE — ED AVS SNAPSHOT
Marcia Salamanca   MRN: [id number]    Department:  [facility] Emergency Department   Date of Visit:  8/10/2019           Disclosure     Insurance plans vary and the physician(s) referred by the ER may not be covered by your plan.  Please contact you tell this physician (or your personal doctor if your instructions are to return to your personal doctor) about any new or lasting problems. The primary care or specialist physician will see patients referred from the [facility] Emergency Department.  Israel Fung